# Patient Record
Sex: MALE | Race: BLACK OR AFRICAN AMERICAN | Employment: FULL TIME | URBAN - METROPOLITAN AREA
[De-identification: names, ages, dates, MRNs, and addresses within clinical notes are randomized per-mention and may not be internally consistent; named-entity substitution may affect disease eponyms.]

---

## 2017-10-11 ENCOUNTER — TELEPHONE (OUTPATIENT)
Dept: FAMILY MEDICINE CLINIC | Age: 47
End: 2017-10-11

## 2017-10-12 RX ORDER — ALBUTEROL SULFATE 90 UG/1
AEROSOL, METERED RESPIRATORY (INHALATION)
Qty: 1 INHALER | Refills: 6 | Status: SHIPPED | OUTPATIENT
Start: 2017-10-12 | End: 2018-01-12 | Stop reason: SDUPTHER

## 2017-10-13 ENCOUNTER — OFFICE VISIT (OUTPATIENT)
Dept: FAMILY MEDICINE CLINIC | Age: 47
End: 2017-10-13
Payer: COMMERCIAL

## 2017-10-13 VITALS
DIASTOLIC BLOOD PRESSURE: 80 MMHG | HEIGHT: 70 IN | SYSTOLIC BLOOD PRESSURE: 130 MMHG | BODY MASS INDEX: 23.48 KG/M2 | HEART RATE: 99 BPM | WEIGHT: 164 LBS

## 2017-10-13 DIAGNOSIS — L30.9 CHRONIC ECZEMA: ICD-10-CM

## 2017-10-13 DIAGNOSIS — J45.20 MILD INTERMITTENT ASTHMA WITHOUT COMPLICATION: Primary | ICD-10-CM

## 2017-10-13 PROCEDURE — 99213 OFFICE O/P EST LOW 20 MIN: CPT | Performed by: FAMILY MEDICINE

## 2017-10-13 RX ORDER — PREDNISONE 20 MG/1
TABLET ORAL
Qty: 20 TABLET | Refills: 0 | Status: SHIPPED | OUTPATIENT
Start: 2017-10-13 | End: 2018-01-12 | Stop reason: ALTCHOICE

## 2017-10-13 ASSESSMENT — ENCOUNTER SYMPTOMS
BACK PAIN: 0
CONSTIPATION: 0
BLOOD IN STOOL: 0
DIARRHEA: 0
SHORTNESS OF BREATH: 1
ABDOMINAL PAIN: 0
COUGH: 1
WHEEZING: 0

## 2017-10-13 ASSESSMENT — PATIENT HEALTH QUESTIONNAIRE - PHQ9
2. FEELING DOWN, DEPRESSED OR HOPELESS: 0
1. LITTLE INTEREST OR PLEASURE IN DOING THINGS: 0
SUM OF ALL RESPONSES TO PHQ9 QUESTIONS 1 & 2: 0
SUM OF ALL RESPONSES TO PHQ QUESTIONS 1-9: 0

## 2017-10-13 NOTE — PROGRESS NOTES
Lang Cervantes is a 42 y.o. male who presents today for his medical conditions/complaints as noted below. Lang Cervantes is c/o of Asthma and Health Maintenance (flu, pneumonia, adacel)  Breathing troubles cough for few weeks. HPI:     Visit Information    Have you changed or started any medications since your last visit including any over-the-counter medicines, vitamins, or herbal medicines? no   Are you having any side effects from any of your medications? -  no  Have you stopped taking any of your medications? Is so, why? -  no    Have you seen any other physician or provider since your last visit? No  Have you had any other diagnostic tests since your last visit? No  Have you been seen in the emergency room and/or had an admission to a hospital since we last saw you? No  Have you had your routine dental cleaning in the past 6 months? yes -     Have you activated your [employer] account? If not, what are your barriers? No:      Patient Care Team:  Frankie Sharpe MD as PCP - General (Family Medicine)  Zulma Day MD as Consulting Physician (Ophthalmology)    Medical History Review  Past Medical, Family, and Social History reviewed and  contribute to the patient presenting condition    Health Maintenance   Topic Date Due    HIV screen  12/30/1987    DTaP/Tdap/Td vaccine (1 - Tdap) 12/30/1991    Pneumococcal med risk (1 of 1 - PPSV23) 12/30/1991    Lipid screen  12/30/2012    Flu vaccine (1) 09/01/2017       Past Medical History:   Diagnosis Date    Asthma     Eczema       No past surgical history on file. Family History   Problem Relation Age of Onset    Kidney Disease Mother     Hypertension Mother      Social History   Substance Use Topics    Smoking status: Current Every Day Smoker     Packs/day: 1.00     Years: 15.00     Types: Cigarettes, Cigars    Smokeless tobacco: Never Used    Alcohol use 2.5 oz/week     5 drink(s) per week      Comment: soc.       Current Outpatient Prescriptions

## 2017-10-24 ENCOUNTER — OFFICE VISIT (OUTPATIENT)
Dept: DERMATOLOGY | Age: 47
End: 2017-10-24
Payer: COMMERCIAL

## 2017-10-24 VITALS
HEART RATE: 103 BPM | OXYGEN SATURATION: 96 % | SYSTOLIC BLOOD PRESSURE: 112 MMHG | DIASTOLIC BLOOD PRESSURE: 75 MMHG | BODY MASS INDEX: 23.08 KG/M2 | HEIGHT: 70 IN | WEIGHT: 161.2 LBS

## 2017-10-24 DIAGNOSIS — L20.84 INTRINSIC ATOPIC DERMATITIS: Primary | ICD-10-CM

## 2017-10-24 PROCEDURE — 99203 OFFICE O/P NEW LOW 30 MIN: CPT | Performed by: DERMATOLOGY

## 2017-10-24 RX ORDER — CLOBETASOL PROPIONATE 0.5 MG/G
OINTMENT TOPICAL
Qty: 60 G | Refills: 2 | Status: SHIPPED | OUTPATIENT
Start: 2017-10-24 | End: 2018-04-27 | Stop reason: SDUPTHER

## 2017-10-24 NOTE — PATIENT INSTRUCTIONS
faster putting your maintenance or rescue medicines on them. Remember that your eczema medicines only go on red, rough, thick, or itchy patches of eczema. Continue to use your moisturizer on the footprints several times a day. Your moisturizer may help prevent new, itchy patches and footprints from forming. If you run out of medications or feel that your childs skin is getting worse despite following your treatment plan, please call us. If you think that you will run out of medications over the weekend or during a holiday, please try to call us during normal business hours so that we may get you the refills you need without delay.

## 2017-10-24 NOTE — PROGRESS NOTES
Dermatology Patient Note  [facility] [address]  [address] [place] [id number]  Dept: [phone]  Dept Fax: [phone]      VISIT DATE: 10/24/2017   REFERRING PROVIDER: Melvina Morgan MD      Annika Mata is a 42 y.o. male  who presents today in the office for:    New Patient (eczema his whole life. Does not use anything on it currently)      HISTORY OF PRESENT ILLNESS:  HPI Eczema:    Mundo Johnson was seen today for initial evaluation of eczema. Duration of Eczema:  Years (since childhood)    Course: persistent and worsening    Areas of Involvement: forehead, neck, flexures    Associated Symptoms: Pruritis    Exacerbating Factors: none    Current Bathing Routine: daily    Current Medications for Eczema: prednisone    Previously Tried Topical Medications: clobetasol    Previously Tried Systemic Medications: prednisone, methotrexate    Previous Evaluation: None    Problem Specific Family Hx: eczema        CURRENT MEDICATIONS:   Current Outpatient Prescriptions   Medication Sig Dispense Refill    clobetasol (TEMOVATE) 0.05 % ointment Apply to eczema twice daily (not face, armpit or groin) 60 g 2    predniSONE (DELTASONE) 20 MG tablet Take 3 tabs daily for 3 days, then 2 a day for 3 days, then 1 a day for 3 days, then 1/2 a day for 3 days. 20 tablet 0    albuterol sulfate HFA (PROAIR HFA) 108 (90 Base) MCG/ACT inhaler INHALE TWO PUFFS BY MOUTH FOUR TIMES A DAY AS NEEDED 1 Inhaler 6    tadalafil (CIALIS) 20 MG tablet Take 1 tablet by mouth as needed for Erectile Dysfunction 12 tablet 3     No current facility-administered medications for this visit. ALLERGIES:   No Known Allergies    SOCIAL HISTORY:  Social History   Substance Use Topics    Smoking status: Current Every Day Smoker     Packs/day: 1.00     Years: 15.00     Types: Cigarettes, Cigars    Smokeless tobacco: Never Used    Alcohol use 2.5 oz/week     5 drink(s) per week      Comment: soc.        REVIEW OF SYSTEMS:  Review of Systems   Constitutional: Negative. Skin: Denies any new changing, growing or bleeding lesions or rashes except as described in the HPI     PHYSICAL EXAM:   /75 (Site: Right Arm, Position: Sitting, Cuff Size: Medium Adult)   Pulse 103   Ht 5' 10\" (1.778 m)   Wt 161 lb 3.2 oz (73.1 kg)   SpO2 96%   BMI 23.13 kg/m²     General Exam:  General Appearance: No acute distress, Well nourished     Neuro: Alert  Psych: Not Performed   Lymph Node: Not performed    Cutaneous Exam: Performed as documented in clinic note below. Total skin excluding undergarment areas, which includes the head/face, neck, both arms, chest, back, abdomen, both legs, digits and/or nails, was examined. Pertinent Physical Exam Findings:  Physical Exam   Skin:            Medical Necessity of Exam Performed:   Widespread Rash    Additional Diagnostic Testing performed during exam: Not performed ,  Not performed    ASSESSMENT:  1. Intrinsic atopic dermatitis         Plan of Action is as Follows:  Assessment 1. Intrinsic atopic dermatitis  1. Discussed that the patient has eczema a chronic condition which can be flared by bacteria or environmental allergies  2. Discussed the treatments for eczema including topical medications, antihistamines and fragrance free products. 3. Discussed need to moisturize twice daily with a thick bland emollient  4. Restart clobetasol ointment BID to eczema on the body  5. I discussed in detail treatment options of topicals and systemic medications. The patient is on the highest level topical clobetasol and has not had adequate effect. The patient has previously been on methotrexate for urinary half without adequate effect. He also drinks alcohol which makes his less than a ideal treatment for future use. At this point in time I discussed within the likely the best treatment option would be Dupixent.   I discussed that we will pursue approval once approved set him up to given medications. 6. Discussed side effects of topical steroids including skin thinning and atrophy and importance of using topical steroids only on active eczema            Patient Instructions   Eczema Instructions    The medicines and treatments used to take care of your child's eczema may change depending on the condition of the skin. Eczema flare-ups may come and go. We cannot cure eczema, but we can help control it with these treatments. Baths:  1-2 times a day with a mild soap such as Dove for Sensitive Skin, Cetaphil Cleanser, Cerave Cleanser, Aquaphor Wash or Vanicream Bar. Apply moisturizer within 3 minutes of patting dry. Medicines Prescribed by your Doctor    These medications should only be put on the eczema that you can see or feel. Eczema patches are red, rough, thickened or itchy. Once the skin looks and feels normal stop the medicated creams and ointments. These medications are chosen based on the location and thickness of your eczema. We always want to use the weakest medicated creams and ointments that will control your child's eczema. This will reduce the risk of side effects. If your eczema is not improving on this treatment plan or you need to use your Rescue medicines longer than recommended please call the dermatology clinic. Maintenance Medicines    Maintenance medicines can be used any day when eczema is seen or felt. Apply clobetasol to eczema on body, arms and legs 2 times a day when eczema is present. Moisturizer: This should be applied to all of our child's skin (including skin with eczema and skin without eczema). Continue to use this everyday even when your child's eczema is doing really well. Apply moisturizer at least 2 times a day to all of your child's skin. If you are applying a prescription cream at the same time as a moisturizer, put the prescription cream on first and your moisturizer on top.     Skin color changes may stay after the rough eczema

## 2017-10-24 NOTE — LETTER
[facility]) Dermatology   [address] [place] [address],[address]  [address] [address]  Phone: [phone]  Fax: [phone]     Ryne Clay MD       October 24, 2017     William Ballard Md  [address] [place] [address] [address] [place] [address]     Patient: Geoffrey Abbott   MR Number: [id number]   YOB: 1970   Date of Visit: 10/24/2017     Dear Dr. Sara Bolivar: Thank you for the request for consultation for Mr. Fox to me for evaluation. Below are the relevant portions of my assessment and plan of care. 1. Intrinsic atopic dermatitis  1. Discussed that the patient has eczema a chronic condition which can be flared by bacteria or environmental allergies  2. Discussed the treatments for eczema including topical medications, antihistamines and fragrance free products. 3. Discussed need to moisturize twice daily with a thick bland emollient  4. Restart clobetasol ointment BID to eczema on the body  5. I discussed in detail treatment options of topicals and systemic medications. The patient is on the highest level topical clobetasol and has not had adequate effect. The patient has previously been on methotrexate for urinary half without adequate effect. He also drinks alcohol which makes his less than a ideal treatment for future use. At this point in time I discussed within the likely the best treatment option would be Dupixent. I discussed that we will pursue approval once approved set him up to given medications. 6. Discussed side effects of topical steroids including skin thinning and atrophy and importance of using topical steroids only on active eczema         Patient Instructions   Eczema Instructions    The medicines and treatments used to take care of your child's eczema may change depending on the condition of the skin. Eczema flare-ups may come and go. We cannot cure eczema, but we can help control it with these treatments.

## 2017-11-07 ENCOUNTER — NURSE ONLY (OUTPATIENT)
Dept: DERMATOLOGY | Age: 47
End: 2017-11-07
Payer: COMMERCIAL

## 2017-11-07 NOTE — PATIENT INSTRUCTIONS
Patient Information  DUPIXENT® (DU-pix-ent) (dupilumab) Injection,  for subcutaneous use    What is DUPIXENT? ? Clemon Shield is a prescription medicine used to treat adults with moderate-to-severe atopic dermatitis (eczema) that is not well controlled  with prescription therapies used on the skin (topical), or who cannot use topical therapies. ? Clemon Shield can be used with or without topical corticosteroids. ? It is not known if DUPIXENT is safe and effective in children. Do not use DUPIXENT if you are allergic to dupilumab or to any of the ingredients in Clemon Shield. See the end of this leaflet for a complete  list of ingredients in Clemon Shield. Before using Clemon Shield, tell your healthcare provider about all your medical conditions, including if you:  ? have eye problems  ? have a parasitic (helminth) infection  ? have asthma  ? are scheduled to receive any vaccinations. You should not receive a ?live vaccine? if you are treated with Clemon Shield. ? are pregnant or plan to become pregnant. It is not known whether Clemon Shield will harm your unborn baby. ? are breastfeeding or plan to breastfeed. It is not known whether Clemon Shield passes into your breast milk. Tell your healthcare provider about all of the medicines you take, including prescription and over-the-counter medicines, vitamins, and herbal  supplements. If you have asthma and are taking asthma medicines, do not change or stop your asthma medicine without talking to your  healthcare provider. How should I use DUPIXENT? ? See the detailed ? Instructions for Use? that comes with Clemon Shield for information on how to prepare and inject DUPIXENT and  how to properly store and throw away (dispose of) used DUPIXENT pre-filled syringes. ? Use DUPIXENT exactly as prescribed by your healthcare provider. ? Clemon Shield comes as a single-dose pre-filled syringe with needle shield. ? Clemon Shield is given as an injection under the skin (subcutaneous injection).   ? If your healthcare provider decides that you or a caregiver can give the injections of Kyleview, you or your caregiver should receive  training on the right way to prepare and inject Kyleview. Do not try to inject Kyleview until you have been shown the right way by  your healthcare provider. ? If you miss a dose of DUPIXENT, give the injection within 7 days from the missed dose, then continue with the original schedule. If the  missed dose is not given within 7 days, wait until the next scheduled dose to give your DUPIXENT injection. ? If you inject more DUPIXENT than prescribed, call your healthcare provider right away. ? Your healthcare provider may prescribe other topical medicines to use with Kyleview. Use the other prescribed topical medicines  exactly as your healthcare provider tells you to. What are the possible side effects of DUPIXENT? DUPIXENT can cause serious side effects, including:  ? Allergic reactions. Stop using Kyleview and go to the nearest hospital emergency room if you get any of the following symptoms:   fever   general ill feeling   swollen lymph nodes   hives   itching   joint pain   skin rash  ? Eye problems. Tell your healthcare provider if you have any new or worsening eye problems, including eye pain or changes in vision. The most common side effects of DUPIXENT include:  ? injection site reactions  ? eye and eyelid inflammation, including redness, swelling and itching  ? cold sores in your mouth or on your lips  Tell your healthcare provider if you have any side effect that bothers you or that does not go away. These are not all of the possible side effects of DUPIXENT. Call your doctor for medical advice about side effects. You may report side effects to FDA at 2-076-FDA-7934. General information about the safe and effective use of DUPIXENT. Medicines are sometimes prescribed for purposes other than those listed in a Patient Information leaflet.  Do not use DUPIXENT for a  condition for which it was not prescribed. Do not give DUPIXENT to other people, even if they have the same symptoms that you have. It  may harm them. You can ask your pharmacist or healthcare provider for information about Kyleview that is written for health professionals. What are the ingredients in Kyleview? Active ingredient: dupilumab  Inactive ingredients: L-arginine hydrochloride, L-histidine, polysorbate 80, sodium acetate, sucrose, and water for injection. Manufactured by: [address] [place] [address].[address] [place] [address]. License No. [id number]  Marketed by: Amy Sewell, [address] [place] [address] and [address] [place] [address]  [address] is a registered trademark of [address] / © 2017 [address] [place] [address]. / Amy Atwood. All  rights reserved. For more information about DUPIXENT, go to www. [employer]. [employer]abs or call 9- 412-DUPIXMATT (6-584.113.8511). This Patient Information has been approved by the U.S. Food and Drug Administration.  Issued: March 2017  [id number]

## 2017-11-08 PROCEDURE — 96372 THER/PROPH/DIAG INJ SC/IM: CPT | Performed by: DERMATOLOGY

## 2017-11-14 ENCOUNTER — TELEPHONE (OUTPATIENT)
Dept: DERMATOLOGY | Age: 47
End: 2017-11-14

## 2017-11-14 NOTE — TELEPHONE ENCOUNTER
Spoke with Nieyc to order next dose of dupixent.  It is due to arrive at the clinic on Thursday between 8-4:00

## 2017-11-21 ENCOUNTER — NURSE ONLY (OUTPATIENT)
Dept: DERMATOLOGY | Age: 47
End: 2017-11-21
Payer: COMMERCIAL

## 2017-11-21 DIAGNOSIS — L30.9 CHRONIC ECZEMA: Primary | ICD-10-CM

## 2017-11-21 DIAGNOSIS — Z71.89 OTHER SPECIFIED COUNSELING: ICD-10-CM

## 2017-11-21 PROCEDURE — 96372 THER/PROPH/DIAG INJ SC/IM: CPT | Performed by: DERMATOLOGY

## 2017-11-21 NOTE — PROGRESS NOTES
Patient was in for 2nd Dupixent injection and to educate his girlfriend who is a nurse on proper injection technique. After giving girlfriend instruction using training needle, she demonstrated understanding and mock injection on patient. Patient was given injection by me in the RUQ of abdomen. He tolerated the procedure and a band aid was placed at the site of injection. Completed paperwork with patient for reimbursement of 1st Dupixent prescription from  and gave him the box with 3rd injection of Dupixent as he will have his girlfriend give him in 2 weeks.

## 2018-01-12 ENCOUNTER — OFFICE VISIT (OUTPATIENT)
Dept: FAMILY MEDICINE CLINIC | Age: 48
End: 2018-01-12
Payer: COMMERCIAL

## 2018-01-12 VITALS
SYSTOLIC BLOOD PRESSURE: 124 MMHG | WEIGHT: 162 LBS | BODY MASS INDEX: 23.24 KG/M2 | OXYGEN SATURATION: 95 % | DIASTOLIC BLOOD PRESSURE: 70 MMHG | HEART RATE: 98 BPM

## 2018-01-12 DIAGNOSIS — N52.9 ERECTILE DYSFUNCTION, UNSPECIFIED ERECTILE DYSFUNCTION TYPE: ICD-10-CM

## 2018-01-12 DIAGNOSIS — M77.32 HEEL SPUR, LEFT: Primary | ICD-10-CM

## 2018-01-12 DIAGNOSIS — J45.909 MODERATE ASTHMA WITHOUT COMPLICATION, UNSPECIFIED WHETHER PERSISTENT: ICD-10-CM

## 2018-01-12 PROCEDURE — 99213 OFFICE O/P EST LOW 20 MIN: CPT | Performed by: FAMILY MEDICINE

## 2018-01-12 RX ORDER — ALBUTEROL SULFATE 90 UG/1
AEROSOL, METERED RESPIRATORY (INHALATION)
Qty: 1 INHALER | Refills: 6 | Status: SHIPPED | OUTPATIENT
Start: 2018-01-12 | End: 2021-08-17 | Stop reason: SDUPTHER

## 2018-01-12 RX ORDER — TADALAFIL 20 MG/1
20 TABLET ORAL PRN
Qty: 12 TABLET | Refills: 3 | Status: SHIPPED | OUTPATIENT
Start: 2018-01-12 | End: 2019-08-06 | Stop reason: SDUPTHER

## 2018-01-12 ASSESSMENT — ENCOUNTER SYMPTOMS
SHORTNESS OF BREATH: 0
ABDOMINAL PAIN: 0
CONSTIPATION: 0
DIARRHEA: 0
BACK PAIN: 0
COUGH: 0
WHEEZING: 0
BLOOD IN STOOL: 0

## 2018-02-22 ENCOUNTER — TELEPHONE (OUTPATIENT)
Dept: DERMATOLOGY | Age: 48
End: 2018-02-22

## 2018-04-27 ENCOUNTER — OFFICE VISIT (OUTPATIENT)
Dept: DERMATOLOGY | Age: 48
End: 2018-04-27
Payer: COMMERCIAL

## 2018-04-27 VITALS
DIASTOLIC BLOOD PRESSURE: 75 MMHG | BODY MASS INDEX: 24.48 KG/M2 | OXYGEN SATURATION: 95 % | HEIGHT: 70 IN | WEIGHT: 171 LBS | HEART RATE: 87 BPM | SYSTOLIC BLOOD PRESSURE: 115 MMHG

## 2018-04-27 DIAGNOSIS — L20.84 INTRINSIC ATOPIC DERMATITIS: Primary | ICD-10-CM

## 2018-04-27 PROCEDURE — 99213 OFFICE O/P EST LOW 20 MIN: CPT | Performed by: DERMATOLOGY

## 2018-04-27 RX ORDER — CLOBETASOL PROPIONATE 0.5 MG/G
OINTMENT TOPICAL
Qty: 60 G | Refills: 4 | Status: SHIPPED | OUTPATIENT
Start: 2018-04-27 | End: 2019-05-08 | Stop reason: SDUPTHER

## 2018-09-24 RX ORDER — DUPILUMAB 300 MG/2ML
INJECTION, SOLUTION SUBCUTANEOUS
Qty: 4 ML | Refills: 2 | Status: SHIPPED | OUTPATIENT
Start: 2018-09-24 | End: 2018-11-08 | Stop reason: SDUPTHER

## 2018-11-08 ENCOUNTER — HOSPITAL ENCOUNTER (OUTPATIENT)
Age: 48
Discharge: HOME | End: 2018-11-08
Payer: COMMERCIAL

## 2018-11-08 ENCOUNTER — OFFICE VISIT (OUTPATIENT)
Dept: DERMATOLOGY | Age: 48
End: 2018-11-08
Payer: COMMERCIAL

## 2018-11-08 VITALS
HEART RATE: 89 BPM | SYSTOLIC BLOOD PRESSURE: 123 MMHG | OXYGEN SATURATION: 95 % | DIASTOLIC BLOOD PRESSURE: 82 MMHG | BODY MASS INDEX: 26.45 KG/M2 | WEIGHT: 184.8 LBS | HEIGHT: 70 IN

## 2018-11-08 DIAGNOSIS — L20.84 INTRINSIC ATOPIC DERMATITIS: Primary | ICD-10-CM

## 2018-11-08 DIAGNOSIS — Z79.899 HIGH RISK MEDICATION USE: ICD-10-CM

## 2018-11-08 LAB
ABSOLUTE EOS #: 0.33 K/UL (ref 0–0.44)
ABSOLUTE IMMATURE GRANULOCYTE: <0.03 K/UL (ref 0–0.3)
ABSOLUTE LYMPH #: 1.25 K/UL (ref 1.1–3.7)
ABSOLUTE MONO #: 0.46 K/UL (ref 0.1–1.2)
ALT SERPL-CCNC: 26 U/L (ref 5–41)
AST SERPL-CCNC: 40 U/L
BASOPHILS # BLD: 1 % (ref 0–2)
BASOPHILS ABSOLUTE: 0.05 K/UL (ref 0–0.2)
BUN BLDV-MCNC: 13 MG/DL (ref 6–20)
CREAT SERPL-MCNC: 0.93 MG/DL (ref 0.7–1.2)
DIFFERENTIAL TYPE: ABNORMAL
EOSINOPHILS RELATIVE PERCENT: 8 % (ref 1–4)
GFR AFRICAN AMERICAN: >60 ML/MIN
GFR NON-AFRICAN AMERICAN: >60 ML/MIN
GFR SERPL CREATININE-BSD FRML MDRD: NORMAL ML/MIN/{1.73_M2}
GFR SERPL CREATININE-BSD FRML MDRD: NORMAL ML/MIN/{1.73_M2}
HCT VFR BLD CALC: 44 % (ref 40.7–50.3)
HEMOGLOBIN: 14.6 G/DL (ref 13–17)
IMMATURE GRANULOCYTES: 0 %
LYMPHOCYTES # BLD: 29 % (ref 24–43)
MCH RBC QN AUTO: 31.8 PG (ref 25.2–33.5)
MCHC RBC AUTO-ENTMCNC: 33.2 G/DL (ref 28.4–34.8)
MCV RBC AUTO: 95.9 FL (ref 82.6–102.9)
MONOCYTES # BLD: 11 % (ref 3–12)
NRBC AUTOMATED: 0 PER 100 WBC
PDW BLD-RTO: 13.8 % (ref 11.8–14.4)
PLATELET # BLD: 216 K/UL (ref 138–453)
PLATELET ESTIMATE: ABNORMAL
PMV BLD AUTO: 11 FL (ref 8.1–13.5)
RBC # BLD: 4.59 M/UL (ref 4.21–5.77)
RBC # BLD: ABNORMAL 10*6/UL
SEG NEUTROPHILS: 51 % (ref 36–65)
SEGMENTED NEUTROPHILS ABSOLUTE COUNT: 2.29 K/UL (ref 1.5–8.1)
WBC # BLD: 4.4 K/UL (ref 3.5–11.3)
WBC # BLD: ABNORMAL 10*3/UL

## 2018-11-08 PROCEDURE — 99213 OFFICE O/P EST LOW 20 MIN: CPT | Performed by: DERMATOLOGY

## 2018-11-08 PROCEDURE — 36415 COLL VENOUS BLD VENIPUNCTURE: CPT

## 2018-11-08 PROCEDURE — 84460 ALANINE AMINO (ALT) (SGPT): CPT

## 2018-11-08 PROCEDURE — 84520 ASSAY OF UREA NITROGEN: CPT

## 2018-11-08 PROCEDURE — 85025 COMPLETE CBC W/AUTO DIFF WBC: CPT

## 2018-11-08 PROCEDURE — 84450 TRANSFERASE (AST) (SGOT): CPT

## 2018-11-08 PROCEDURE — 82565 ASSAY OF CREATININE: CPT

## 2018-11-08 NOTE — PATIENT INSTRUCTIONS
1. Labs today  2. Follow up in 6 weeks    Patient Information  DUPIXENT® (DU-pix-ent) (dupilumab) Injection,  for subcutaneous use    What is DUPIXENT? ? Carloyn Isabel is a prescription medicine used to treat adults with moderate-to-severe atopic dermatitis (eczema) that is not well controlled  with prescription therapies used on the skin (topical), or who cannot use topical therapies. ? Carloyn Isabel can be used with or without topical corticosteroids. ? It is not known if DUPIXENT is safe and effective in children. Do not use DUPIXENT if you are allergic to dupilumab or to any of the ingredients in Carloyn Isabel. See the end of this leaflet for a complete  list of ingredients in Carloyn Isabel. Before using Carloyn Alber, tell your healthcare provider about all your medical conditions, including if you:  ? have eye problems  ? have a parasitic (helminth) infection  ? have asthma  ? are scheduled to receive any vaccinations. You should not receive a ?live vaccine? if you are treated with Carloyn Isabel. ? are pregnant or plan to become pregnant. It is not known whether Carloyn Isabel will harm your unborn baby. ? are breastfeeding or plan to breastfeed. It is not known whether Carloyn Alber passes into your breast milk. Tell your healthcare provider about all of the medicines you take, including prescription and over-the-counter medicines, vitamins, and herbal  supplements. If you have asthma and are taking asthma medicines, do not change or stop your asthma medicine without talking to your  healthcare provider. How should I use DUPIXENT? ? See the detailed ? Instructions for Use? that comes with Carloyn Isabel for information on how to prepare and inject DUPIXENT and  how to properly store and throw away (dispose of) used DUPIXENT pre-filled syringes. ? Use DUPIXENT exactly as prescribed by your healthcare provider. ? Carloyn Alber comes as a single-dose pre-filled syringe with needle shield.   ? Carloyn Isabel is given as an injection under the skin

## 2019-05-08 ENCOUNTER — OFFICE VISIT (OUTPATIENT)
Dept: DERMATOLOGY | Age: 49
End: 2019-05-08
Payer: COMMERCIAL

## 2019-05-08 VITALS
BODY MASS INDEX: 25.14 KG/M2 | SYSTOLIC BLOOD PRESSURE: 122 MMHG | OXYGEN SATURATION: 94 % | HEIGHT: 70 IN | HEART RATE: 100 BPM | DIASTOLIC BLOOD PRESSURE: 77 MMHG | WEIGHT: 175.6 LBS

## 2019-05-08 DIAGNOSIS — L20.84 INTRINSIC ATOPIC DERMATITIS: Primary | ICD-10-CM

## 2019-05-08 PROCEDURE — 99213 OFFICE O/P EST LOW 20 MIN: CPT | Performed by: DERMATOLOGY

## 2019-05-08 RX ORDER — CLOBETASOL PROPIONATE 0.5 MG/G
OINTMENT TOPICAL
Qty: 60 G | Refills: 4 | Status: SHIPPED | OUTPATIENT
Start: 2019-05-08 | End: 2020-06-05 | Stop reason: SDUPTHER

## 2019-05-08 RX ORDER — TACROLIMUS 1 MG/G
OINTMENT TOPICAL
Qty: 30 G | Refills: 5 | Status: SHIPPED | OUTPATIENT
Start: 2019-05-08 | End: 2020-06-05 | Stop reason: SDUPTHER

## 2019-05-09 NOTE — PROGRESS NOTES
Dermatology Patient Note  [facility]ERMATOLOGY  [address] [facility]  [address] [place] [address]  Dept: [phone]  Dept Fax: [phone]      VISIT DATE: 2019   REFERRING PROVIDER: No ref. provider found      Shabnam Higgins is a 57 y.o. male  who presents today in the office for:    Follow-up (6 month follow up on dupixent. Doing well. Last inj . )      HISTORY OF PRESENT ILLNESS:  HPI Eczema Followup:    Burton Salazar was seen today for follow-up evaluation of eczema. Interim Course: Improving    Areas of Involvement: now just face    Associated Symptoms: Pruritis    Exacerbating Factors: being off medication    Current Bathing Routine: sensitive    Current Moisturizing Routine: thick emollient    Current Medications for Eczema: clobetaso, dupixent    Eczema Medication Compliance: Using all Topical and Systemic Medications as Prescribed at Last Visit    Side Effects from Treatments: None    Interim Evaluation: None          CURRENT MEDICATIONS:   Current Outpatient Medications   Medication Sig Dispense Refill    clobetasol (TEMOVATE) 0.05 % ointment Apply to eczema twice daily (not face, armpit or groin) 60 g 4    dupilumab (DUPIXENT) 300 MG/2ML SOSY injection INJECT 300MG SUBCUTANEOUSLY EVERY OTHER WEEK 4 mL 6    tacrolimus (PROTOPIC) 0.1 % ointment Apply to eczema on face twice daily 30 g 5    albuterol sulfate HFA (PROAIR HFA) 108 (90 Base) MCG/ACT inhaler INHALE TWO PUFFS BY MOUTH FOUR TIMES A DAY AS NEEDED 1 Inhaler 6    tadalafil (CIALIS) 20 MG tablet Take 1 tablet by mouth as needed for Erectile Dysfunction 12 tablet 3     No current facility-administered medications for this visit.         ALLERGIES:   No Known Allergies    SOCIAL HISTORY:  Social History     Tobacco Use    Smoking status: Former Smoker     Packs/day: 1.00     Years: 15.00     Pack years: 15.00     Types: Cigarettes, Cigars     Last attempt to quit: 10/10/2017     Years since quittin.5    Smokeless tobacco: Never Used   Substance Use Topics    Alcohol use: Yes     Alcohol/week: 2.5 oz     Types: 5 Standard drinks or equivalent per week     Comment: soc. REVIEW OF SYSTEMS:  Review of Systems   Constitutional: Negative. Skin:Denies any new changing, growing or bleeding lesions or rashes except as described in the HPI     PHYSICAL EXAM:   /77 (Site: Right Upper Arm, Position: Sitting, Cuff Size: Medium Adult)   Pulse 100   Ht 5' 10\" (1.778 m)   Wt 175 lb 9.6 oz (79.7 kg)   SpO2 94%   BMI 25.20 kg/m²     General Exam:  General Appearance: No acute distress, Well nourished     Neuro: Alert and oriented to person, place and time  Psych: Normal affect   Lymph Node: Not performed    Cutaneous Exam: Performed as documented in clinic note below. Sun-exposed skin,which includes the head/face, neck, both arms, digits and/or nails was examined. Pertinent Physical Exam Findings:  Physical Exam   Skin:   Focal eczema on the face       Medical Necessity of Exam Performed:   Distribution of patient concerns    Additional Diagnostic Testing performed during exam: Not performed ,  Not performed    ASSESSMENT:   Diagnosis Orders   1. Intrinsic atopic dermatitis         Plan of Action is as Follows:  Assessment 1. Intrinsic atopic dermatitis  - Continue dupixent 300 mg every other week - SE denied  - Protopic BID to facial eczema  - Clobetasol as needed to eczema on the body          Patient Instructions   1. Call Ian Vaughan @ [phone] to discuss Dupixent  2. Follow up in 6 months      Photo surveillance performed: No    Follow-up:  6 months    This note was created with the assistance of aspeech-recognition program.  Although the intention is to generate a document that actually reflects thecontent of the visit, no guarantees can be provided that every mistake has been identified and corrected by editing.     Electronically signed by Bijan Angulo MD on 5/9/19 at 7:58 AM

## 2019-08-06 ENCOUNTER — OFFICE VISIT (OUTPATIENT)
Dept: FAMILY MEDICINE CLINIC | Age: 49
End: 2019-08-06
Payer: COMMERCIAL

## 2019-08-06 VITALS
DIASTOLIC BLOOD PRESSURE: 82 MMHG | SYSTOLIC BLOOD PRESSURE: 130 MMHG | RESPIRATION RATE: 16 BRPM | OXYGEN SATURATION: 98 % | HEART RATE: 69 BPM

## 2019-08-06 DIAGNOSIS — Z80.42 FH: PROSTATE CANCER: ICD-10-CM

## 2019-08-06 DIAGNOSIS — N52.9 ERECTILE DYSFUNCTION, UNSPECIFIED ERECTILE DYSFUNCTION TYPE: ICD-10-CM

## 2019-08-06 DIAGNOSIS — Z51.81 MEDICATION MONITORING ENCOUNTER: ICD-10-CM

## 2019-08-06 DIAGNOSIS — L30.9 CHRONIC ECZEMA: ICD-10-CM

## 2019-08-06 DIAGNOSIS — J45.909 MODERATE ASTHMA WITHOUT COMPLICATION, UNSPECIFIED WHETHER PERSISTENT: Primary | ICD-10-CM

## 2019-08-06 PROCEDURE — 99213 OFFICE O/P EST LOW 20 MIN: CPT | Performed by: FAMILY MEDICINE

## 2019-08-06 RX ORDER — TADALAFIL 20 MG/1
20 TABLET ORAL PRN
Qty: 12 TABLET | Refills: 3 | Status: SHIPPED | OUTPATIENT
Start: 2019-08-06 | End: 2021-01-29 | Stop reason: SDUPTHER

## 2019-08-06 ASSESSMENT — ENCOUNTER SYMPTOMS
CONSTIPATION: 0
BLOOD IN STOOL: 0
WHEEZING: 1
ABDOMINAL PAIN: 0
DIARRHEA: 0
COUGH: 0
SHORTNESS OF BREATH: 0
BACK PAIN: 0

## 2019-08-06 ASSESSMENT — PATIENT HEALTH QUESTIONNAIRE - PHQ9
2. FEELING DOWN, DEPRESSED OR HOPELESS: 0
SUM OF ALL RESPONSES TO PHQ QUESTIONS 1-9: 0
SUM OF ALL RESPONSES TO PHQ QUESTIONS 1-9: 0
SUM OF ALL RESPONSES TO PHQ9 QUESTIONS 1 & 2: 0
1. LITTLE INTEREST OR PLEASURE IN DOING THINGS: 0

## 2019-08-06 NOTE — PROGRESS NOTES
Nae Carias is a 57 y.o. male who presents todayfor his medical conditions/complaints as noted below. Nae Carias is here today c/oProstate Cancer (would like screened for this via blood test)  Routine follow up he tells me he is very concerned over his FH of prostate cancer (brother and father). His eczema and asthma are under very good control.      :     Visit Information    Have you changed or started any medications since your last visit including any over-the-counter medicines, vitamins, or herbal medicines? no   Have you stopped taking any of your medications? Is so, why? -  no  Are you having any side effects from any of your medications? - no    Have you seen any other physician or provider since your last visit?  no   Have you had any other diagnostic tests since your last visit?  no   Have you been seen in the emergency room and/or had an admission in a hospital since we last saw you?  no   Have you had your routine dental cleaning in the past 6 months?  yes -      Do you have an active MyChart account? If no, what is the barrier? Yes    Patient Care Team:  Edward Rodriguez MD as PCP - General (Family Medicine)  Edward Rodriguez MD as PCP - [facility] Empaneled Provider  Kim Fowler MD as Consulting Physician (Ophthalmology)    Medical History Review  Past Medical, Family, and Social History reviewed and does not contribute to the patient presenting condition    Health Maintenance   Topic Date Due    Pneumococcal 0-64 years Vaccine (1 of 1 - PPSV23) 12/30/1978    HIV screen  12/30/1987    DTaP/Tdap/Td vaccine (1 - Tdap) 12/30/1991    Lipid screen  12/30/2012    Diabetes screen  12/30/2012    Flu vaccine (1) 09/01/2019                 Past Medical History:   Diagnosis Date    Asthma     Eczema       No past surgical history on file.   Family History   Problem Relation Age of Onset    Kidney Disease Mother     Hypertension Mother      Social History     Tobacco Use    Smoking status:

## 2019-08-07 ENCOUNTER — HOSPITAL ENCOUNTER (OUTPATIENT)
Age: 49
Discharge: HOME | End: 2019-08-07
Payer: COMMERCIAL

## 2019-08-07 DIAGNOSIS — Z51.81 MEDICATION MONITORING ENCOUNTER: ICD-10-CM

## 2019-08-07 DIAGNOSIS — Z80.42 FH: PROSTATE CANCER: ICD-10-CM

## 2019-08-07 DIAGNOSIS — J45.909 MODERATE ASTHMA WITHOUT COMPLICATION, UNSPECIFIED WHETHER PERSISTENT: ICD-10-CM

## 2019-08-07 LAB
ALBUMIN SERPL-MCNC: 4.5 G/DL (ref 3.5–5.2)
ALBUMIN/GLOBULIN RATIO: 1.5 (ref 1–2.5)
ALP BLD-CCNC: 90 U/L (ref 40–129)
ALT SERPL-CCNC: 22 U/L (ref 5–41)
ANION GAP SERPL CALCULATED.3IONS-SCNC: 14 MMOL/L (ref 9–17)
AST SERPL-CCNC: 22 U/L
BILIRUB SERPL-MCNC: 0.67 MG/DL (ref 0.3–1.2)
BUN BLDV-MCNC: 10 MG/DL (ref 6–20)
BUN/CREAT BLD: NORMAL (ref 9–20)
CALCIUM SERPL-MCNC: 9.7 MG/DL (ref 8.6–10.4)
CHLORIDE BLD-SCNC: 107 MMOL/L (ref 98–107)
CHOLESTEROL/HDL RATIO: 5
CHOLESTEROL: 233 MG/DL
CO2: 20 MMOL/L (ref 20–31)
CREAT SERPL-MCNC: 0.81 MG/DL (ref 0.7–1.2)
GFR AFRICAN AMERICAN: >60 ML/MIN
GFR NON-AFRICAN AMERICAN: >60 ML/MIN
GFR SERPL CREATININE-BSD FRML MDRD: NORMAL ML/MIN/{1.73_M2}
GFR SERPL CREATININE-BSD FRML MDRD: NORMAL ML/MIN/{1.73_M2}
GLUCOSE BLD-MCNC: 81 MG/DL (ref 70–99)
HDLC SERPL-MCNC: 47 MG/DL
LDL CHOLESTEROL: 161 MG/DL (ref 0–130)
POTASSIUM SERPL-SCNC: 4.3 MMOL/L (ref 3.7–5.3)
PROSTATE SPECIFIC ANTIGEN: 0.78 UG/L
SODIUM BLD-SCNC: 141 MMOL/L (ref 135–144)
TOTAL PROTEIN: 7.5 G/DL (ref 6.4–8.3)
TRIGL SERPL-MCNC: 127 MG/DL
VLDLC SERPL CALC-MCNC: ABNORMAL MG/DL (ref 1–30)

## 2019-08-07 PROCEDURE — G0103 PSA SCREENING: HCPCS

## 2019-08-07 PROCEDURE — 80061 LIPID PANEL: CPT

## 2019-08-07 PROCEDURE — 36415 COLL VENOUS BLD VENIPUNCTURE: CPT

## 2019-08-07 PROCEDURE — 80053 COMPREHEN METABOLIC PANEL: CPT

## 2019-11-12 ENCOUNTER — OFFICE VISIT (OUTPATIENT)
Dept: DERMATOLOGY | Age: 49
End: 2019-11-12
Payer: COMMERCIAL

## 2019-11-12 VITALS
DIASTOLIC BLOOD PRESSURE: 84 MMHG | OXYGEN SATURATION: 92 % | SYSTOLIC BLOOD PRESSURE: 125 MMHG | HEART RATE: 99 BPM | HEIGHT: 70 IN | WEIGHT: 168.2 LBS | BODY MASS INDEX: 24.08 KG/M2

## 2019-11-12 DIAGNOSIS — L20.84 INTRINSIC ATOPIC DERMATITIS: Primary | ICD-10-CM

## 2019-11-12 PROCEDURE — 11900 INJECT SKIN LESIONS </W 7: CPT | Performed by: DERMATOLOGY

## 2019-11-12 PROCEDURE — 99213 OFFICE O/P EST LOW 20 MIN: CPT | Performed by: DERMATOLOGY

## 2020-04-13 RX ORDER — DUPILUMAB 300 MG/2ML
INJECTION, SOLUTION SUBCUTANEOUS
Qty: 4 ML | Refills: 2 | Status: SHIPPED | OUTPATIENT
Start: 2020-04-13 | End: 2020-06-05 | Stop reason: SDUPTHER

## 2020-06-05 ENCOUNTER — TELEMEDICINE (OUTPATIENT)
Dept: DERMATOLOGY | Age: 50
End: 2020-06-05
Payer: COMMERCIAL

## 2020-06-05 PROCEDURE — 99213 OFFICE O/P EST LOW 20 MIN: CPT | Performed by: DERMATOLOGY

## 2020-06-05 RX ORDER — FLURANDRENOLIDE 4 UG/CM2
TAPE TOPICAL
Qty: 1 EACH | Refills: 5 | Status: SHIPPED | OUTPATIENT
Start: 2020-06-05 | End: 2021-01-29

## 2020-06-05 RX ORDER — CLOBETASOL PROPIONATE 0.5 MG/G
OINTMENT TOPICAL
Qty: 60 G | Refills: 4 | Status: SHIPPED | OUTPATIENT
Start: 2020-06-05 | End: 2021-01-28 | Stop reason: SDUPTHER

## 2020-06-05 RX ORDER — TACROLIMUS 1 MG/G
OINTMENT TOPICAL
Qty: 30 G | Refills: 5 | Status: SHIPPED | OUTPATIENT
Start: 2020-06-05 | End: 2021-01-28 | Stop reason: SDUPTHER

## 2020-06-05 RX ORDER — DUPILUMAB 300 MG/2ML
INJECTION, SOLUTION SUBCUTANEOUS
Qty: 4 ML | Refills: 5 | Status: SHIPPED | OUTPATIENT
Start: 2020-06-05 | End: 2020-11-05 | Stop reason: SDUPTHER

## 2020-06-07 NOTE — PROGRESS NOTES
TELEHEALTH EVALUATION -- Audio/Visual (During WPHKW-48 public health emergency)    Dermatology Patient Note  [id number]  [address] [facility] [address]  Dept: [phone]  Dept Fax: [phone]      VISIT DATE: 6/5/2020   REFERRING PROVIDER: No ref. provider found      Gina Rodriguez is a 54 y.o. male  who presents today in the office for:    Eczema      HISTORY OF PRESENT ILLNESS:  HPI Eczema Followup:    Gilda Walter was seen today for follow-up evaluation of eczema. Interim Course: Improving    Areas of Involvement: was generalized, now just gets focal areas    Associated Symptoms: Pruritis    Exacerbating Factors: Heat    Current Medications for Eczema: dupixent, cordran for resistant areas, clobetasol very intermittent to thick areas on body, protopic for thin areas and facial    Eczema Medication Compliance: Using all Topical and Systemic Medications as Prescribed at Last Visit    Side Effects from Treatments: None    Interim Evaluation: None          CURRENT MEDICATIONS:   Current Outpatient Medications   Medication Sig Dispense Refill    tacrolimus (PROTOPIC) 0.1 % ointment Apply to eczema on face twice daily 30 g 5    clobetasol (TEMOVATE) 0.05 % ointment Apply to eczema twice daily (not face, armpit or groin) 60 g 4    dupilumab (DUPIXENT) 300 MG/2ML SOSY injection INJECT 300MG SUBCUTANEOUSLY EVERY OTHER WEEK 4 mL 5    Flurandrenolide (CORDRAN) 4 MCG/SQCM TAPE Cut to size and apply to resistant eczema daily until resolved 1 each 5    tadalafil (CIALIS) 20 MG tablet Take 1 tablet by mouth as needed for Erectile Dysfunction 12 tablet 3    albuterol sulfate HFA (PROAIR HFA) 108 (90 Base) MCG/ACT inhaler INHALE TWO PUFFS BY MOUTH FOUR TIMES A DAY AS NEEDED 1 Inhaler 6     No current facility-administered medications for this visit.         ALLERGIES:   No Known Allergies    SOCIAL HISTORY:  Social History     Tobacco Use    Smoking status: Former Smoker     Packs/day: 1.00     Years: 15.00     Pack years: 15.00     Types: Cigarettes, Cigars     Last attempt to quit: 10/10/2017     Years since quittin.6    Smokeless tobacco: Never Used   Substance Use Topics    Alcohol use: Yes     Alcohol/week: 4.2 standard drinks     Types: 5 Standard drinks or equivalent per week     Comment: soc. REVIEW OF SYSTEMS:  Review of Systems   Constitutional: Negative. Skin:Denies any new changing, growing or bleeding lesions or rashes except as described in the HPI     PHYSICAL EXAM:   There were no vitals taken for this visit. General Exam:  General Appearance: No acute distress, Well nourished     Neuro: Alert and oriented to person, place and time  Psych: Normal affect   Lymph Node: Not performed    Cutaneous Exam: Performed as documented in clinic note below. Sun-exposed skin,which includes the head/face, neck, both arms, digits and/or nails was examined. Due to this being a TeleHealth encounter, evaluation of the following organ systems is limited: Vitals/Constitutional/EENT/Resp/CV/GI//MS/Neuro/Skin/Heme-Lymph-Imm. In particular examination of the skin is limited by video quality and patient available technology. Pertinent Physical Exam Findings:  Physical Exam  Skin:     Comments: Eczema clear         Medical Necessity of Exam Performed:   Distribution of patient concerns    Additional Diagnostic Testing performed during exam: Not performed ,  Not performed    ASSESSMENT:   Diagnosis Orders   1. Intrinsic atopic dermatitis         Plan of Action is as Follows:  Assessment 1.  Intrinsic atopic dermatitis  - Currently clear, SE of treatments reviewed and denied  - Continue dupixent 300 mg every 2 weeks  - Continue Protopic for facial eczema  - Continue clobetasol for thick body plaques  - Continue cordran for resistant areas  - Follow up in 6 months          Photo surveillance performed: No    Follow-up: 6 months    Pursuant to the emergency declaration under the [facility], [facility] waiver authority and the [facility] and [employer] Act, this Virtual  Visit was conducted, with patient's consent, to reduce the patient's risk of exposure to COVID-19 and provide continuity of care for an established patient. Services were provided through a video synchronous discussion virtually to substitute for in-person clinic visit.      Electronically signed by Joelle Ayala MD on 6/7/20 at 9:19 AM EDT

## 2020-09-30 ENCOUNTER — OFFICE VISIT (OUTPATIENT)
Dept: FAMILY MEDICINE CLINIC | Age: 50
End: 2020-09-30
Payer: COMMERCIAL

## 2020-09-30 ENCOUNTER — HOSPITAL ENCOUNTER (OUTPATIENT)
Age: 50
Setting detail: SPECIMEN
Discharge: HOME | End: 2020-09-30
Payer: COMMERCIAL

## 2020-09-30 VITALS
BODY MASS INDEX: 25.37 KG/M2 | OXYGEN SATURATION: 95 % | DIASTOLIC BLOOD PRESSURE: 80 MMHG | TEMPERATURE: 97.2 F | HEART RATE: 84 BPM | WEIGHT: 177.2 LBS | SYSTOLIC BLOOD PRESSURE: 120 MMHG | HEIGHT: 70 IN

## 2020-09-30 LAB
ABSOLUTE EOS #: 0.4 K/UL (ref 0–0.44)
ABSOLUTE IMMATURE GRANULOCYTE: <0.03 K/UL (ref 0–0.3)
ABSOLUTE LYMPH #: 1.03 K/UL (ref 1.1–3.7)
ABSOLUTE MONO #: 0.34 K/UL (ref 0.1–1.2)
BASOPHILS # BLD: 2 % (ref 0–2)
BASOPHILS ABSOLUTE: 0.07 K/UL (ref 0–0.2)
DIFFERENTIAL TYPE: ABNORMAL
EOSINOPHILS RELATIVE PERCENT: 12 % (ref 1–4)
HCT VFR BLD CALC: 47 % (ref 40.7–50.3)
HEMOGLOBIN: 15.5 G/DL (ref 13–17)
IMMATURE GRANULOCYTES: 0 %
LYMPHOCYTES # BLD: 31 % (ref 24–43)
MCH RBC QN AUTO: 31.9 PG (ref 25.2–33.5)
MCHC RBC AUTO-ENTMCNC: 33 G/DL (ref 28.4–34.8)
MCV RBC AUTO: 96.7 FL (ref 82.6–102.9)
MONOCYTES # BLD: 10 % (ref 3–12)
NRBC AUTOMATED: 0 PER 100 WBC
PDW BLD-RTO: 12.8 % (ref 11.8–14.4)
PLATELET # BLD: 238 K/UL (ref 138–453)
PLATELET ESTIMATE: ABNORMAL
PMV BLD AUTO: 10.8 FL (ref 8.1–13.5)
PROSTATE SPECIFIC ANTIGEN: 1.14 UG/L
RBC # BLD: 4.86 M/UL (ref 4.21–5.77)
RBC # BLD: ABNORMAL 10*6/UL
SEDIMENTATION RATE, ERYTHROCYTE: 8 MM (ref 0–15)
SEG NEUTROPHILS: 45 % (ref 36–65)
SEGMENTED NEUTROPHILS ABSOLUTE COUNT: 1.47 K/UL (ref 1.5–8.1)
WBC # BLD: 3.3 K/UL (ref 3.5–11.3)
WBC # BLD: ABNORMAL 10*3/UL

## 2020-09-30 PROCEDURE — 99213 OFFICE O/P EST LOW 20 MIN: CPT | Performed by: FAMILY MEDICINE

## 2020-09-30 RX ORDER — TOBRAMYCIN 3 MG/ML
1 SOLUTION/ DROPS OPHTHALMIC EVERY 4 HOURS
Qty: 1 BOTTLE | Refills: 0 | Status: SHIPPED | OUTPATIENT
Start: 2020-09-30 | End: 2020-10-10

## 2020-09-30 RX ORDER — ALBUTEROL SULFATE 90 UG/1
2 AEROSOL, METERED RESPIRATORY (INHALATION) EVERY 6 HOURS PRN
Qty: 1 INHALER | Refills: 3 | Status: SHIPPED | OUTPATIENT
Start: 2020-09-30 | End: 2021-01-29

## 2020-09-30 ASSESSMENT — PATIENT HEALTH QUESTIONNAIRE - PHQ9
SUM OF ALL RESPONSES TO PHQ9 QUESTIONS 1 & 2: 0
SUM OF ALL RESPONSES TO PHQ QUESTIONS 1-9: 0
2. FEELING DOWN, DEPRESSED OR HOPELESS: 0
SUM OF ALL RESPONSES TO PHQ QUESTIONS 1-9: 0
1. LITTLE INTEREST OR PLEASURE IN DOING THINGS: 0

## 2020-09-30 ASSESSMENT — ENCOUNTER SYMPTOMS
EYE ITCHING: 1
EYE DISCHARGE: 1
PHOTOPHOBIA: 0
DIARRHEA: 0
SHORTNESS OF BREATH: 0
CONSTIPATION: 0
COUGH: 0
WHEEZING: 0
BLOOD IN STOOL: 0
ABDOMINAL PAIN: 0
BACK PAIN: 0
EYE REDNESS: 1

## 2020-09-30 ASSESSMENT — VISUAL ACUITY: OU: 1

## 2020-09-30 NOTE — PROGRESS NOTES
Aleksandr Juares is a 54 y.o. male who presents todayfor his medical conditions/complaints as noted below. Aleksandr Juares is here today c/oDiscuss Medications and Eye Problem      :     HPI    Red eye    Past Medical History:   Diagnosis Date    Asthma     Eczema       No past surgical history on file. Family History   Problem Relation Age of Onset    Kidney Disease Mother     Hypertension Mother      Social History     Tobacco Use    Smoking status: Former Smoker     Packs/day: 1.00     Years: 15.00     Pack years: 15.00     Types: Cigarettes, Cigars     Last attempt to quit: 10/10/2017     Years since quittin.9    Smokeless tobacco: Never Used   Substance Use Topics    Alcohol use: Yes     Alcohol/week: 4.2 standard drinks     Types: 5 Standard drinks or equivalent per week     Comment: soc. Current Outpatient Medications   Medication Sig Dispense Refill    tobramycin (TOBREX) 0.3 % ophthalmic solution Place 1 drop into the left eye every 4 hours for 10 days 1 Bottle 0    albuterol sulfate HFA (PROAIR HFA) 108 (90 Base) MCG/ACT inhaler Inhale 2 puffs into the lungs every 6 hours as needed for Wheezing 1 Inhaler 3    tacrolimus (PROTOPIC) 0.1 % ointment Apply to eczema on face twice daily 30 g 5    clobetasol (TEMOVATE) 0.05 % ointment Apply to eczema twice daily (not face, armpit or groin) 60 g 4    dupilumab (DUPIXENT) 300 MG/2ML SOSY injection INJECT 300MG SUBCUTANEOUSLY EVERY OTHER WEEK 4 mL 5    Flurandrenolide (CORDRAN) 4 MCG/SQCM TAPE Cut to size and apply to resistant eczema daily until resolved 1 each 5    tadalafil (CIALIS) 20 MG tablet Take 1 tablet by mouth as needed for Erectile Dysfunction 12 tablet 3    albuterol sulfate HFA (PROAIR HFA) 108 (90 Base) MCG/ACT inhaler INHALE TWO PUFFS BY MOUTH FOUR TIMES A DAY AS NEEDED 1 Inhaler 6     No current facility-administered medications for this visit.       No Known Allergies      Subjective:   Review of Systems    :   BP 120/80   Pulse 84   Temp 97.2 °F (36.2 °C)   Ht 5' 10\" (1.778 m)   Wt 177 lb 3.2 oz (80.4 kg)   SpO2 95%   BMI 25.43 kg/m²     Physical Exam    Assessment:       Diagnosis Orders   1. Keratitis  VIGNESH Drake MD, Ophthalmology, [place]    CBC Auto Differential    Sedimentation rate, automated   2. Prostate cancer screening  Psa screening         Plan:      Return if symptoms worsen or fail to improve.     Orders Placed This Encounter   Procedures    CBC Auto Differential     Standing Status:   Future     Standing Expiration Date:   9/30/2021    Psa screening     Standing Status:   Future     Standing Expiration Date:   9/30/2021    Sedimentation rate, automated     Standing Status:   Future     Standing Expiration Date:   9/30/2021    VIGNESH Drake MD, Ophthalmology, [place]     Referral Priority:   Routine     Referral Type:   Eval and Treat     Referral Reason:   Specialty Services Required     Referred to Provider:   Emma Smith MD     Requested Specialty:   Ophthalmology     Number of Visits Requested:   1     Orders Placed This Encounter   Medications    tobramycin (TOBREX) 0.3 % ophthalmic solution     Sig: Place 1 drop into the left eye every 4 hours for 10 days     Dispense:  1 Bottle     Refill:  0    albuterol sulfate HFA (PROAIR HFA) 108 (90 Base) MCG/ACT inhaler     Sig: Inhale 2 puffs into the lungs every 6 hours as needed for Wheezing     Dispense:  1 Inhaler     Refill:  3

## 2020-09-30 NOTE — PROGRESS NOTES
facility-administered medications for this visit. No Known Allergies      Subjective:   Review of Systems   Constitutional: Negative for chills, diaphoresis, fatigue and fever. HENT: Negative for congestion and hearing loss. Eyes: Positive for discharge, redness and itching. Negative for photophobia and visual disturbance. Respiratory: Negative for cough, shortness of breath and wheezing. Cardiovascular: Negative for chest pain, palpitations and leg swelling. Gastrointestinal: Negative for abdominal pain, blood in stool, constipation and diarrhea. Genitourinary: Negative for dysuria. Musculoskeletal: Negative for arthralgias, back pain, gait problem and neck pain. Skin: Negative for rash. Neurological: Negative for weakness, numbness and headaches. Psychiatric/Behavioral: Negative for dysphoric mood and sleep disturbance.       :   /80   Pulse 84   Temp 97.2 °F (36.2 °C)   Ht 5' 10\" (1.778 m)   Wt 177 lb 3.2 oz (80.4 kg)   SpO2 95%   BMI 25.43 kg/m²     Physical Exam  Constitutional:       General: He is not in acute distress. Appearance: He is well-developed. He is not diaphoretic. HENT:      Head: Normocephalic and atraumatic. Mouth/Throat:      Pharynx: No oropharyngeal exudate. Eyes:      General: Vision grossly intact. No allergic shiner or scleral icterus. Right eye: No discharge. Left eye: Discharge present. Conjunctiva/sclera:      Right eye: Right conjunctiva is not injected. Left eye: Left conjunctiva is injected. Neck:      Musculoskeletal: Neck supple. Thyroid: No thyromegaly. Vascular: No carotid bruit. Cardiovascular:      Rate and Rhythm: Normal rate and regular rhythm. Heart sounds: Normal heart sounds. No murmur. No friction rub. No gallop. Pulmonary:      Effort: No respiratory distress. Breath sounds: Normal breath sounds. No wheezing or rales. Chest:      Chest wall: No tenderness. Abdominal:      Tenderness: There is no abdominal tenderness. Musculoskeletal:         General: No tenderness. Lymphadenopathy:      Cervical: No cervical adenopathy. Skin:     Findings: No rash. Neurological:      Mental Status: He is alert and oriented to person, place, and time. Cranial Nerves: No cranial nerve deficit. Coordination: Coordination normal.   Psychiatric:         Behavior: Behavior normal.         Thought Content: Thought content normal.         Judgment: Judgment normal.         Assessment:       Diagnosis Orders   1. Keratitis  VIGNESH Fletcher MD, Ophthalmology, [facility]    CBC Auto Differential    Sedimentation rate, automated   2. Prostate cancer screening  Psa screening         Plan:      No follow-ups on file. Orders Placed This Encounter   Procedures    CBC Auto Differential     Standing Status:   Future     Standing Expiration Date:   9/30/2021    Psa screening     Standing Status:   Future     Standing Expiration Date:   9/30/2021    Sedimentation rate, automated     Standing Status:   Future     Standing Expiration Date:   9/30/2021    VIGNESH Fletcher MD, Ophthalmology, [facility]     Referral Priority:   Routine     Referral Type:   Eval and Treat     Referral Reason:   Specialty Services Required     Referred to Provider:   Radha Hernandez MD     Requested Specialty:   Ophthalmology     Number of Visits Requested:   1     Orders Placed This Encounter   Medications    tobramycin (TOBREX) 0.3 % ophthalmic solution     Sig: Place 1 drop into the left eye every 4 hours for 10 days     Dispense:  1 Bottle     Refill:  0    albuterol sulfate HFA (PROAIR HFA) 108 (90 Base) MCG/ACT inhaler     Sig: Inhale 2 puffs into the lungs every 6 hours as needed for Wheezing     Dispense:  1 Inhaler     Refill:  3     Because possible reaction to his meds Eye exam will be pursued to rule out drug effect.

## 2020-11-04 NOTE — TELEPHONE ENCOUNTER
Len Caruso is requesting a refill on the following medications:   Requested Prescriptions     Pending Prescriptions Disp Refills    dupilumab (DUPIXENT) 300 MG/2ML SOSY injection 4 mL 5     Sig: INJECT 300MG SUBCUTANEOUSLY EVERY OTHER WEEK       Last OV 6/5    Future Appointments   Date Time Provider Keron Barroso   1/14/2021  3:00 PM DO STUART Gould [facility]   1/28/2021  1:45 PM Caitlin Arndt MD  derm [facility]

## 2020-11-05 RX ORDER — DUPILUMAB 300 MG/2ML
INJECTION, SOLUTION SUBCUTANEOUS
Qty: 4 ML | Refills: 2 | Status: SHIPPED | OUTPATIENT
Start: 2020-11-05 | End: 2020-11-17 | Stop reason: SDUPTHER

## 2020-11-16 NOTE — TELEPHONE ENCOUNTER
Afsaneh  is requesting a refill on the following medications:   Requested Prescriptions     Pending Prescriptions Disp Refills    dupilumab (DUPIXENT) 300 MG/2ML SOSY injection 4 mL 2     Sig: INJECT 300MG SUBCUTANEOUSLY EVERY OTHER WEEK       Last OV 6/5    Future Appointments   Date Time Provider Keron Barroso   1/28/2021  1:45 PM Carmencita Brambila MD  derm [id number]   1/29/2021  2:00 PM DO Carmelita Faustin [address]         Last script went to [facility], it need [facility] to [facility]

## 2020-11-17 RX ORDER — DUPILUMAB 300 MG/2ML
INJECTION, SOLUTION SUBCUTANEOUS
Qty: 4 ML | Refills: 2 | Status: SHIPPED | OUTPATIENT
Start: 2020-11-17 | End: 2021-01-28 | Stop reason: SDUPTHER

## 2021-01-28 ENCOUNTER — OFFICE VISIT (OUTPATIENT)
Dept: DERMATOLOGY | Age: 51
End: 2021-01-28
Payer: COMMERCIAL

## 2021-01-28 VITALS
BODY MASS INDEX: 25.34 KG/M2 | OXYGEN SATURATION: 94 % | HEIGHT: 70 IN | DIASTOLIC BLOOD PRESSURE: 81 MMHG | HEART RATE: 87 BPM | TEMPERATURE: 97.9 F | SYSTOLIC BLOOD PRESSURE: 154 MMHG | WEIGHT: 177 LBS

## 2021-01-28 DIAGNOSIS — L20.84 INTRINSIC ATOPIC DERMATITIS: Primary | ICD-10-CM

## 2021-01-28 DIAGNOSIS — H10.89 OTHER CONJUNCTIVITIS OF BOTH EYES: ICD-10-CM

## 2021-01-28 PROCEDURE — 99214 OFFICE O/P EST MOD 30 MIN: CPT | Performed by: DERMATOLOGY

## 2021-01-28 RX ORDER — TACROLIMUS 1 MG/G
OINTMENT TOPICAL
Qty: 30 G | Refills: 5 | Status: ON HOLD | OUTPATIENT
Start: 2021-01-28 | End: 2022-06-28

## 2021-01-28 RX ORDER — CLOBETASOL PROPIONATE 0.5 MG/G
OINTMENT TOPICAL
Qty: 60 G | Refills: 5 | Status: ON HOLD | OUTPATIENT
Start: 2021-01-28 | End: 2022-06-28

## 2021-01-28 RX ORDER — CLOBETASOL PROPIONATE 0.5 MG/G
OINTMENT TOPICAL
Qty: 60 G | Refills: 5 | Status: SHIPPED | OUTPATIENT
Start: 2021-01-28 | End: 2021-01-28

## 2021-01-28 RX ORDER — DUPILUMAB 300 MG/2ML
INJECTION, SOLUTION SUBCUTANEOUS
Qty: 4 ML | Refills: 5 | Status: SHIPPED | OUTPATIENT
Start: 2021-01-28 | End: 2022-02-18

## 2021-01-28 RX ORDER — TRIAMCINOLONE ACETONIDE 1 MG/G
CREAM TOPICAL
Qty: 80 G | Refills: 5 | Status: SHIPPED | OUTPATIENT
Start: 2021-01-28 | End: 2021-01-29

## 2021-01-28 RX ORDER — TRIAMCINOLONE ACETONIDE 1 MG/G
CREAM TOPICAL
Qty: 80 G | Refills: 5 | Status: SHIPPED | OUTPATIENT
Start: 2021-01-28 | End: 2021-01-28

## 2021-01-28 RX ORDER — TACROLIMUS 1 MG/G
OINTMENT TOPICAL
Qty: 30 G | Refills: 5 | Status: SHIPPED | OUTPATIENT
Start: 2021-01-28 | End: 2021-01-28

## 2021-01-28 NOTE — PROGRESS NOTES
Types: 5 Standard drinks or equivalent per week     Comment: soc. Pertinent ROS:  Review of Systems  Skin: Denies any new changing, growing or bleeding lesions or rashes except as described in the HPI   Constitutional: Denies fevers, chills, and malaise. PHYSICAL EXAM:   BP (!) 154/81   Pulse 87   Temp 97.9 °F (36.6 °C)   Ht 5' 10\" (1.778 m)   Wt 177 lb (80.3 kg)   SpO2 94%   BMI 25.40 kg/m²     The patient is generally well appearing, well nourished, alert and conversational. Affect is normal.    Cutaneous Exam:  Physical Exam  Waist-up skin, which includes the head/face,neck, both arms, chest, back, abdomen, digits and/or nails, was examined. Diagnoses/exam findings/medical history pertinent to this visit are listed below:    Assessment and Plan:  Assessment   1. Intrinsic atopic dermatitis  - chronic illness, responding to treatment but not yet at goal  - dupilumab (DUPIXENT) 300 MG/2ML SOSY injection; INJECT 300MG SUBCUTANEOUSLY EVERY OTHER WEEK  Dispense: 4 mL; Refill: 5  - clobetasol (TEMOVATE) 0.05 % ointment; Apply to eczema twice daily (not face, armpit or groin)  Dispense: 60 g; Refill: 5  - tacrolimus (PROTOPIC) 0.1 % ointment; Apply to eczema on face twice daily  Dispense: 30 g; Refill: 5    2.  Other conjunctivitis of both eyes  - SE of dupixent  - follow-up ophtho - make sure to ask if they feel it's safe to continue dupixent          RTC 6 months    Patient Instructions   - Requesting records from Dr. Lamont Duncan; follow up for eye redness  - Triamcinolone cream twice daily to active eczema   - Continue tacrolimus twice daily to active eczema on face and groin  - Continue to apply a thick, bland moisturizer to all of skin daily; can be applied over top of triamcinolone and tacrolimus  - Follow up in 6 months This note was created with the assistance of a speech-recognition program.  Although the intention is to generate a document that actually reflects the content of the visit, no guarantees can be provided that every mistake has been identified and corrected byediting.     Electronically signed by Josi Morrison MD on 1/28/21 at 1:51 PM EST

## 2021-01-28 NOTE — PATIENT INSTRUCTIONS
- Requesting records from Dr. Nanda Holcomb; follow up for eye redness  - Triamcinolone cream twice daily to active eczema   - Continue tacrolimus twice daily to active eczema on face and groin  - Continue to apply a thick, bland moisturizer to all of skin daily; can be applied over top of triamcinolone and tacrolimus  - Follow up in 6 months

## 2021-01-29 ENCOUNTER — OFFICE VISIT (OUTPATIENT)
Dept: FAMILY MEDICINE CLINIC | Age: 51
End: 2021-01-29
Payer: COMMERCIAL

## 2021-01-29 VITALS
OXYGEN SATURATION: 97 % | HEIGHT: 70 IN | DIASTOLIC BLOOD PRESSURE: 80 MMHG | HEART RATE: 95 BPM | SYSTOLIC BLOOD PRESSURE: 122 MMHG | WEIGHT: 180 LBS | TEMPERATURE: 97.2 F | BODY MASS INDEX: 25.77 KG/M2

## 2021-01-29 DIAGNOSIS — N52.9 ERECTILE DYSFUNCTION, UNSPECIFIED ERECTILE DYSFUNCTION TYPE: ICD-10-CM

## 2021-01-29 DIAGNOSIS — L30.9 CHRONIC ECZEMA: ICD-10-CM

## 2021-01-29 DIAGNOSIS — J45.20 MILD INTERMITTENT ASTHMA WITHOUT COMPLICATION: Primary | ICD-10-CM

## 2021-01-29 PROCEDURE — 99213 OFFICE O/P EST LOW 20 MIN: CPT | Performed by: FAMILY MEDICINE

## 2021-01-29 RX ORDER — TADALAFIL 20 MG/1
20 TABLET ORAL PRN
Qty: 30 TABLET | Refills: 3 | Status: SHIPPED | OUTPATIENT
Start: 2021-01-29 | End: 2021-08-17 | Stop reason: SDUPTHER

## 2021-01-29 SDOH — ECONOMIC STABILITY: FOOD INSECURITY: WITHIN THE PAST 12 MONTHS, THE FOOD YOU BOUGHT JUST DIDN'T LAST AND YOU DIDN'T HAVE MONEY TO GET MORE.: NEVER TRUE

## 2021-01-29 SDOH — ECONOMIC STABILITY: INCOME INSECURITY: HOW HARD IS IT FOR YOU TO PAY FOR THE VERY BASICS LIKE FOOD, HOUSING, MEDICAL CARE, AND HEATING?: NOT HARD AT ALL

## 2021-01-29 ASSESSMENT — ENCOUNTER SYMPTOMS
HEARTBURN: 0
WHEEZING: 0
SPUTUM PRODUCTION: 0
SORE THROAT: 0
DIFFICULTY BREATHING: 0
CHEST TIGHTNESS: 0
RHINORRHEA: 0
FREQUENT THROAT CLEARING: 0
HEMOPTYSIS: 0
SHORTNESS OF BREATH: 0
HOARSE VOICE: 0
TROUBLE SWALLOWING: 0
COUGH: 0

## 2021-01-29 ASSESSMENT — PATIENT HEALTH QUESTIONNAIRE - PHQ9
SUM OF ALL RESPONSES TO PHQ QUESTIONS 1-9: 0
SUM OF ALL RESPONSES TO PHQ QUESTIONS 1-9: 0
2. FEELING DOWN, DEPRESSED OR HOPELESS: 0
1. LITTLE INTEREST OR PLEASURE IN DOING THINGS: 0

## 2021-01-29 NOTE — PROGRESS NOTES
[facility] Progress Note    Date:1/29/2021         Patient Name:Jass Fox     YOB: 1970     Age:50 y.o. Assessment/Plan        Problem List Items Addressed This Visit        Respiratory    Asthma - Primary     Very stable  Uses prn albuterol less than 2x weekly            Musculoskeletal and Integument    Chronic eczema     Vastly improved on Dupixent  Follows with dermatology            Other    Erectile dysfunction     Controlled with prn Cialis - refilled         Relevant Medications    tadalafil (CIALIS) 20 MG tablet           Return in about 6 months (around 7/29/2021). Electronically signed by Ibis Prabhakar DO on 1/29/21         Dima Sanchez is a 52 y.o. male presenting today for   Chief Complaint   Patient presents with   [address]   . Eczema  Patient complains of a rash. Onset of symptoms was several years ago, and he started on dupixent about 3 years ago and have been rapidly improving since that time. Risk factors include: has asthma. Treatment modalities that have been used in the past include: dupixent. Follows with derm. Was so bad he didn't leave the house except going to ED for pain    Asthma  There is no chest tightness, cough, difficulty breathing, frequent throat clearing, hemoptysis, hoarse voice, shortness of breath, sputum production or wheezing. This is a chronic problem. The current episode started more than 1 year ago. The problem occurs rarely. The problem has been gradually improving. Pertinent negatives include no appetite change, chest pain, dyspnea on exertion, ear congestion, ear pain, fever, headaches, heartburn, malaise/fatigue, myalgias, nasal congestion, orthopnea, PND, postnasal drip, rhinorrhea, sneezing, sore throat, sweats, trouble swallowing or weight loss. His symptoms are alleviated by beta-agonist. He reports significant improvement on treatment. His past medical history is significant for asthma.        Review of Systems   Review of Systems   Constitutional: Negative for appetite change, fever, malaise/fatigue and weight loss. HENT: Negative for ear pain, hoarse voice, postnasal drip, rhinorrhea, sneezing, sore throat and trouble swallowing. Respiratory: Negative for cough, hemoptysis, sputum production, shortness of breath and wheezing. Cardiovascular: Negative for chest pain, dyspnea on exertion and PND. Gastrointestinal: Negative for heartburn. Musculoskeletal: Negative for myalgias. Neurological: Negative for headaches. All other systems reviewed and are negative. Medications     Current Outpatient Medications   Medication Sig Dispense Refill    tadalafil (CIALIS) 20 MG tablet Take 1 tablet by mouth as needed for Erectile Dysfunction 30 tablet 3    dupilumab (DUPIXENT) 300 MG/2ML SOSY injection INJECT 300MG SUBCUTANEOUSLY EVERY OTHER WEEK 4 mL 5    clobetasol (TEMOVATE) 0.05 % ointment Apply to eczema twice daily (not face, armpit or groin) 60 g 5    tacrolimus (PROTOPIC) 0.1 % ointment Apply to eczema on face twice daily 30 g 5    albuterol sulfate HFA (PROAIR HFA) 108 (90 Base) MCG/ACT inhaler INHALE TWO PUFFS BY MOUTH FOUR TIMES A DAY AS NEEDED 1 Inhaler 6     No current facility-administered medications for this visit. Past History    Past Medical History:   has a past medical history of Asthma and Eczema. Social History:   reports that he quit smoking about 3 years ago. His smoking use included cigarettes and cigars. He has a 15.00 pack-year smoking history. He has never used smokeless tobacco. He reports current alcohol use of about 4.2 standard drinks of alcohol per week. He reports that he does not use drugs. Family History:   Family History   Problem Relation Age of Onset    Kidney Disease Mother     Hypertension Mother        Surgical History: No past surgical history on file.      Physical Examination      Vitals:  /80   Pulse 95   Temp 97.2 °F (36.2 °C)   Ht

## 2021-01-29 NOTE — PATIENT INSTRUCTIONS
Patient Education        Atopic Dermatitis: Care Instructions  Your Care Instructions  Atopic dermatitis (also called eczema) is a skin problem that causes intense itching and a red, raised rash. In severe cases, the rash develops clear fluidfilled blisters. The rash is not contagious. People with this condition seem to have very sensitive immune systems that are likely to react to things that cause allergies. The immune system is the body's way of fighting infection. There is no cure for atopic dermatitis, but you may be able to control it with care at home. Follow-up care is a key part of your treatment and safety. Be sure to make and go to all appointments, and call your doctor if you are having problems. It's also a good idea to know your test results and keep a list of the medicines you take. How can you care for yourself at home? · Use moisturizer at least twice a day. · If your doctor prescribes a cream, use it as directed. If your doctor prescribes other medicine, take it exactly as directed. · Wash the affected area with water only. Soap can make dryness and itching worse. Pat dry. · Apply a moisturizer after bathing. Use a cream such as Lubriderm, Moisturel, or Cetaphil that does not irritate the skin or cause a rash. Apply the cream while your skin is still damp after lightly drying with a towel. · Use cold, wet cloths to reduce itching. · Keep cool, and stay out of the sun. · If itching affects your normal activities, an over-the-counter antihistamine, such as diphenhydramine (Benadryl) or loratadine (Claritin) may help. Read and follow all instructions on the label. When should you call for help? Call your doctor now or seek immediate medical care if:    · Your rash gets worse and you have a fever.     · You have new blisters or bruises, or the rash spreads and looks like a sunburn.     · You have signs of infection, such as:  ? Increased pain, swelling, warmth, or redness. ? Red streaks leading from the rash. ? Pus draining from the rash. ? A fever.     · You have crusting or oozing sores.     · You have joint aches or body aches along with your rash. Watch closely for changes in your health, and be sure to contact your doctor if:    · Your rash does not clear up after 2 to 3 weeks of home treatment.     · Itching interferes with your sleep or daily activities. Where can you learn more? Go to https://Datasnap.io.Venuemob. org and sign in to your [employer] account. Enter H419 in the CIHI box to learn more about \"Atopic Dermatitis: Care Instructions. \"     If you do not have an account, please click on the \"Sign Up Now\" link. Current as of: July 2, 2020               Content Version: 12.6  © 2006-2020 [employer], Incorporated. Care instructions adapted under license by [facility] ([facility]). If you have questions about a medical condition or this instruction, always ask your healthcare professional. [place] [address] any warranty or liability for your use of this information. Patient Education        Learning About High Cholesterol  What is high cholesterol? High cholesterol means that you have too much cholesterol in your blood. Cholesterol is a type of fat. It's needed for many body functions, such as making new cells. Cholesterol is made by your body. It also comes from food you eat. Having high cholesterol can lead to the buildup of plaque in artery walls. This can increase your risk of heart disease and stroke. When your doctor talks about high cholesterol levels, he or she is talking about your total cholesterol and LDL cholesterol (the \"bad\" cholesterol) levels. Your doctor may also speak about HDL (the \"good\" cholesterol) levels. High HDL is linked with a lower risk for heart disease, heart attack, and stroke. Your cholesterol levels help your doctor find out your risk for having a heart attack or stroke. How can you prevent high cholesterol? A heart-healthy lifestyle can help you prevent high cholesterol. This lifestyle helps lower your risk for a heart attack and stroke. · Eat heart-healthy foods. ? Eat fruits, vegetables, whole grains (like oatmeal), dried beans and peas, nuts and seeds, soy products (like tofu), and fat-free or low-fat dairy products. ? Replace butter, margarine, and hydrogenated or partially hydrogenated oils with olive and canola oils. (Canola oil margarine without trans fat is fine.)  ? Replace red meat with fish, poultry, and soy protein (like tofu). ? Limit processed and packaged foods like chips, crackers, and cookies. · Be active. Exercise can improve your cholesterol level. Get at least 30 minutes of exercise on most days of the week. Walking is a good choice. You also may want to do other activities, such as running, swimming, cycling, or playing tennis or team sports. · Stay at a healthy weight. Lose weight if you need to. · Don't smoke. If you need help quitting, talk to your doctor about stop-smoking programs and medicines. These can increase your chances of quitting for good. How is high cholesterol treated? The goal of treatment is to reduce your chances of having a heart attack or stroke. The goal is not to lower your cholesterol numbers only. · You may make lifestyle changes, such as eating healthy foods, not smoking, losing weight, and being more active. · You may have to take medicine. Follow-up care is a key part of your treatment and safety. Be sure to make and go to all appointments, and call your doctor if you are having problems. It's also a good idea to know your test results and keep a list of the medicines you take. Where can you learn more? Go to https://jessi.Deal Pepper. org and sign in to your [employer] One account. Enter P354 in the Piktochart box to learn more about \"Learning About High Cholesterol. \" If you do not have an account, please click on the \"Sign Up Now\" link. Current as of: December 16, 2019               Content Version: 12.6  © 5606-3803 [employer]¡, Incorporated. Care instructions adapted under license by [facility] ([facility]). If you have questions about a medical condition or this instruction, always ask your healthcare professional. [address] any warranty or liability for your use of this information.

## 2021-03-29 ENCOUNTER — TELEPHONE (OUTPATIENT)
Dept: DERMATOLOGY | Age: 51
End: 2021-03-29

## 2021-03-29 NOTE — TELEPHONE ENCOUNTER
Received fax from [facility] that they are unable to reach pt about his Dupixent. Spoke to pt, he is aware that the pharmacy has been attempting to contact him. He states he has their number and will give them a call back.

## 2021-04-14 ENCOUNTER — HOSPITAL ENCOUNTER (OUTPATIENT)
Dept: GENERAL RADIOLOGY | Age: 51
Discharge: HOME | End: 2021-04-16
Payer: COMMERCIAL

## 2021-04-14 ENCOUNTER — NURSE TRIAGE (OUTPATIENT)
Dept: OTHER | Facility: CLINIC | Age: 51
End: 2021-04-14

## 2021-04-14 ENCOUNTER — OFFICE VISIT (OUTPATIENT)
Dept: FAMILY MEDICINE CLINIC | Age: 51
End: 2021-04-14
Payer: COMMERCIAL

## 2021-04-14 ENCOUNTER — HOSPITAL ENCOUNTER (OUTPATIENT)
Age: 51
Discharge: HOME | End: 2021-04-16
Payer: COMMERCIAL

## 2021-04-14 VITALS
DIASTOLIC BLOOD PRESSURE: 74 MMHG | BODY MASS INDEX: 24.82 KG/M2 | OXYGEN SATURATION: 96 % | HEART RATE: 71 BPM | SYSTOLIC BLOOD PRESSURE: 130 MMHG | WEIGHT: 173 LBS

## 2021-04-14 DIAGNOSIS — R93.89 ABNORMAL X-RAY: Primary | ICD-10-CM

## 2021-04-14 DIAGNOSIS — R91.8 LEFT LOWER LOBE PULMONARY INFILTRATE: ICD-10-CM

## 2021-04-14 DIAGNOSIS — R07.89 LEFT-SIDED CHEST WALL PAIN: Primary | ICD-10-CM

## 2021-04-14 DIAGNOSIS — R07.89 COSTOCHONDRAL CHEST PAIN: ICD-10-CM

## 2021-04-14 DIAGNOSIS — R07.89 LEFT-SIDED CHEST WALL PAIN: ICD-10-CM

## 2021-04-14 PROCEDURE — 99213 OFFICE O/P EST LOW 20 MIN: CPT | Performed by: FAMILY MEDICINE

## 2021-04-14 PROCEDURE — 71046 X-RAY EXAM CHEST 2 VIEWS: CPT

## 2021-04-14 RX ORDER — PREDNISONE 20 MG/1
20 TABLET ORAL 2 TIMES DAILY
Qty: 10 TABLET | Refills: 0 | Status: SHIPPED | OUTPATIENT
Start: 2021-04-14 | End: 2021-04-19

## 2021-04-14 ASSESSMENT — ENCOUNTER SYMPTOMS
COUGH: 0
ABDOMINAL PAIN: 0
BACK PAIN: 1
VOMITING: 0
SHORTNESS OF BREATH: 0
SPUTUM PRODUCTION: 0
NAUSEA: 0
ORTHOPNEA: 0
HEMOPTYSIS: 0

## 2021-04-14 NOTE — TELEPHONE ENCOUNTER
Reason for Disposition   Patient wants to be seen    Answer Assessment - Initial Assessment Questions  1. LOCATION: \"Where does it hurt? \"       Dinah Holm left side right at the diaphragm     2. RADIATION: \"Does the pain shoot anywhere else? \" (e.g., chest, back)      Denies    3. ONSET: \"When did the pain begin? \" (e.g., minutes, hours or days ago)       1 week ago    4. SUDDEN: \"Gradual or sudden onset? \"      Gradual but has gotten progressively worse    5. PATTERN \"Does the pain come and go, or is it constant? \"     - If constant: \"Is it getting better, staying the same, or worsening? \"       (Note: Constant means the pain never goes away completely; most serious pain is constant and it progresses)      - If intermittent: \"How long does it last?\" \"Do you have pain now? \"      (Note: Intermittent means the pain goes away completely between bouts)      States it is constant hurts worse with sneezing    6. SEVERITY: \"How bad is the pain? \"  (e.g., Scale 1-10; mild, moderate, or severe)     - MILD (1-3): doesn't interfere with normal activities, abdomen soft and not tender to touch      - MODERATE (4-7): interferes with normal activities or awakens from sleep, tender to touch      - SEVERE (8-10): excruciating pain, doubled over, unable to do any normal activities        States it is more uncomfortable but hurts worse with sneezing or movement    7. RECURRENT SYMPTOM: \"Have you ever had this type of abdominal pain before? \" If so, ask: \"When was the last time? \" and \"What happened that time? \"       States with injured ribs    8. AGGRAVATING FACTORS: \"Does anything seem to cause this pain? \" (e.g., foods, stress, alcohol)      Sneezing or moving makes worse    9. CARDIAC SYMPTOMS: \"Do you have any of the following symptoms: chest pain, difficulty breathing, sweating, nausea? \"      Denies    10. OTHER SYMPTOMS: \"Do you have any other symptoms? \" (e.g., fever, vomiting, diarrhea)        Denies    11.  PREGNANCY: \"Is there any chance you are pregnant? \" \"When was your last menstrual period? \"        n/a    Protocols used: ABDOMINAL PAIN - UPPER-ADULT-OH    Received call from [address] Brandie Galloway at pre-service center [facility] with [employer]. Brief description of triage: see above      Triage indicates for patient to be seen today    Care advice provided, patient verbalizes understanding; denies any other questions or concerns; instructed to call back for any new or worsening symptoms. Writer provided warm transfer to [facility] for appointment scheduling. Attention Provider: Thank you for allowing me to participate in the care of your patient. The patient was connected to triage in response to information provided to the [facility]. Please do not respond through this encounter as the response is not directed to a shared pool.

## 2021-04-14 NOTE — PATIENT INSTRUCTIONS
lightheadedness. ? A fast or uneven pulse. After calling 911, chew 1 adult-strength aspirin. Wait for an ambulance. Do not try to drive yourself.     · You have severe trouble breathing. Call your doctor now or seek immediate medical care if:    · You have a fever or cough.     · You have any trouble breathing.     · Your chest pain gets worse. Watch closely for changes in your health, and be sure to contact your doctor if:    · Your chest pain continues even though you are taking anti-inflammatory medicine.     · Your chest wall pain has not improved after 5 to 7 days. Where can you learn more? Go to https://Aspen Aerogels.Audio Network. org and sign in to your WealthVisor.[email or web address] account. Enter A806 in the [facility] [facility] box to learn more about \"Costochondritis: Care Instructions. \"     If you do not have an account, please click on the \"Sign Up Now\" link. Current as of: February 26, 2020               Content Version: 12.8  © 2006-2021 Healthwise, Incorporated. Care instructions adapted under license by [facility] ([facility]). If you have questions about a medical condition or this instruction, always ask your healthcare professional. [place] [address] any warranty or liability for your use of this information.

## 2021-04-14 NOTE — PROGRESS NOTES
AP[facility] Progress Note    Date:4/14/2021         Patient Name:Jass Fox     YOB: 1970     Age:50 y.o. Assessment/Plan        Problem List Items Addressed This Visit     None      Visit Diagnoses     Left-sided chest wall pain    -  Primary    Relevant Medications    predniSONE (DELTASONE) 20 MG tablet    Other Relevant Orders    XR CHEST (2 VW)    Costochondral chest pain        Relevant Medications    predniSONE (DELTASONE) 20 MG tablet           Return if symptoms worsen or fail to improve. Electronically signed by Home Huffman DO on 4/14/21         Dima Gibson is a 52 y.o. male presenting today for   Chief Complaint   Patient presents with    Abdominal Pain   . Chest Pain   This is a new problem. The current episode started 1 to 4 weeks ago (1.5 weeks). The onset quality is sudden. The problem occurs daily. The problem has been gradually worsening. The pain is present in the lateral region (left lower costal margin). The pain is severe. The quality of the pain is described as pressure, sharp and stabbing. The pain radiates to the upper back. Associated symptoms include back pain. Pertinent negatives include no abdominal pain, claudication, cough, diaphoresis, dizziness, exertional chest pressure, fever, headaches, hemoptysis, irregular heartbeat, leg pain, lower extremity edema, malaise/fatigue, nausea, near-syncope, numbness, orthopnea, palpitations, PND, shortness of breath, sputum production, syncope, vomiting or weakness. The pain is aggravated by coughing, deep breathing and exertion. He has tried nothing for the symptoms. The treatment provided no relief. Review of Systems   Review of Systems   Constitutional: Negative for diaphoresis, fever and malaise/fatigue. Respiratory: Negative for cough, hemoptysis, sputum production and shortness of breath. Cardiovascular: Positive for chest pain.  Negative for palpitations, orthopnea, claudication, syncope, PND and near-syncope. Gastrointestinal: Negative for abdominal pain, nausea and vomiting. Musculoskeletal: Positive for back pain. Neurological: Negative for dizziness, weakness, numbness and headaches. All other systems reviewed and are negative. Medications     Current Outpatient Medications   Medication Sig Dispense Refill    predniSONE (DELTASONE) 20 MG tablet Take 1 tablet by mouth 2 times daily for 5 days 10 tablet 0    tadalafil (CIALIS) 20 MG tablet Take 1 tablet by mouth as needed for Erectile Dysfunction 30 tablet 3    dupilumab (DUPIXENT) 300 MG/2ML SOSY injection INJECT 300MG SUBCUTANEOUSLY EVERY OTHER WEEK 4 mL 5    clobetasol (TEMOVATE) 0.05 % ointment Apply to eczema twice daily (not face, armpit or groin) 60 g 5    tacrolimus (PROTOPIC) 0.1 % ointment Apply to eczema on face twice daily 30 g 5    albuterol sulfate HFA (PROAIR HFA) 108 (90 Base) MCG/ACT inhaler INHALE TWO PUFFS BY MOUTH FOUR TIMES A DAY AS NEEDED 1 Inhaler 6     No current facility-administered medications for this visit. Past History    Past Medical History:   has a past medical history of Asthma and Eczema. Social History:   reports that he quit smoking about 3 years ago. His smoking use included cigarettes and cigars. He has a 15.00 pack-year smoking history. He has never used smokeless tobacco. He reports current alcohol use of about 4.2 standard drinks of alcohol per week. He reports that he does not use drugs. Family History:   Family History   Problem Relation Age of Onset    Kidney Disease Mother     Hypertension Mother        Surgical History: No past surgical history on file. Physical Examination      Vitals:  /74 (Site: Right Upper Arm, Position: Sitting, Cuff Size: Small Adult)   Pulse 71   Wt 173 lb (78.5 kg)   SpO2 96%   BMI 24.82 kg/m²     Physical Exam  Vitals signs and nursing note reviewed. Constitutional:       General: He is not in acute distress. Appearance: Normal appearance. He is normal weight. He is not ill-appearing, toxic-appearing or diaphoretic. HENT:      Head: Normocephalic and atraumatic. Right Ear: External ear normal.      Left Ear: External ear normal.   Eyes:      General: No scleral icterus. Right eye: No discharge. Left eye: No discharge. Conjunctiva/sclera: Conjunctivae normal.   Cardiovascular:      Rate and Rhythm: Normal rate and regular rhythm. Pulses: Normal pulses. Heart sounds: Normal heart sounds. No murmur. No friction rub. No gallop. Pulmonary:      Effort: Pulmonary effort is normal. No respiratory distress. Breath sounds: Normal breath sounds. No stridor. No wheezing, rhonchi or rales. Chest:      Chest wall: Tenderness (markedly tendor over this area) present. No mass, lacerations, deformity, swelling, crepitus or edema. There is no dullness to percussion. Musculoskeletal:      Thoracic back: He exhibits tenderness, pain and spasm. He exhibits normal range of motion, no bony tenderness, no swelling, no edema, no deformity, no laceration and normal pulse. Skin:     General: Skin is warm. Coloration: Skin is not jaundiced or pale. Neurological:      Mental Status: He is alert and oriented to person, place, and time. Mental status is at baseline. Psychiatric:         Mood and Affect: Mood normal.         Behavior: Behavior normal.         Thought Content: Thought content normal.         Judgment: Judgment normal.         Labs/Imaging/Diagnostics   Labs:  No results found for: CMPWITHGFR, CBCAUTODIF, TSHFT4, LABA1C, LIPIDPAN    Imaging Last 24 Hours:  No image results found.

## 2021-08-17 ENCOUNTER — OFFICE VISIT (OUTPATIENT)
Dept: FAMILY MEDICINE CLINIC | Age: 51
End: 2021-08-17
Payer: COMMERCIAL

## 2021-08-17 VITALS
DIASTOLIC BLOOD PRESSURE: 74 MMHG | WEIGHT: 173 LBS | SYSTOLIC BLOOD PRESSURE: 120 MMHG | OXYGEN SATURATION: 97 % | BODY MASS INDEX: 24.82 KG/M2 | HEART RATE: 105 BPM

## 2021-08-17 DIAGNOSIS — J45.909 MODERATE ASTHMA WITHOUT COMPLICATION, UNSPECIFIED WHETHER PERSISTENT: ICD-10-CM

## 2021-08-17 DIAGNOSIS — J45.20 MILD INTERMITTENT ASTHMA WITHOUT COMPLICATION: Primary | ICD-10-CM

## 2021-08-17 DIAGNOSIS — L30.9 CHRONIC ECZEMA: ICD-10-CM

## 2021-08-17 DIAGNOSIS — G89.29 CHRONIC LEFT SHOULDER PAIN: ICD-10-CM

## 2021-08-17 DIAGNOSIS — E78.2 MIXED HYPERLIPIDEMIA: ICD-10-CM

## 2021-08-17 DIAGNOSIS — D72.9 ABNORMAL WBC COUNT: ICD-10-CM

## 2021-08-17 DIAGNOSIS — N52.9 ERECTILE DYSFUNCTION, UNSPECIFIED ERECTILE DYSFUNCTION TYPE: ICD-10-CM

## 2021-08-17 DIAGNOSIS — M25.512 CHRONIC LEFT SHOULDER PAIN: ICD-10-CM

## 2021-08-17 DIAGNOSIS — Z12.11 ENCOUNTER FOR SCREENING FOR MALIGNANT NEOPLASM OF COLON: ICD-10-CM

## 2021-08-17 DIAGNOSIS — Z12.5 PROSTATE CANCER SCREENING: ICD-10-CM

## 2021-08-17 PROCEDURE — 99214 OFFICE O/P EST MOD 30 MIN: CPT | Performed by: FAMILY MEDICINE

## 2021-08-17 RX ORDER — TADALAFIL 20 MG/1
20 TABLET ORAL PRN
Qty: 30 TABLET | Refills: 3 | Status: SHIPPED | OUTPATIENT
Start: 2021-08-17 | End: 2022-02-18 | Stop reason: SDUPTHER

## 2021-08-17 RX ORDER — ALBUTEROL SULFATE 90 UG/1
AEROSOL, METERED RESPIRATORY (INHALATION)
Qty: 1 INHALER | Refills: 6 | Status: SHIPPED | OUTPATIENT
Start: 2021-08-17 | End: 2022-05-26 | Stop reason: SDUPTHER

## 2021-08-17 ASSESSMENT — ENCOUNTER SYMPTOMS
SPUTUM PRODUCTION: 0
HEARTBURN: 0
ALLERGIC/IMMUNOLOGIC NEGATIVE: 1
EYES NEGATIVE: 1
GASTROINTESTINAL NEGATIVE: 1
TROUBLE SWALLOWING: 0
SHORTNESS OF BREATH: 0
RHINORRHEA: 0
WHEEZING: 0
FREQUENT THROAT CLEARING: 0
HOARSE VOICE: 0
CHEST TIGHTNESS: 0
COUGH: 0
SORE THROAT: 0
HEMOPTYSIS: 0
RESPIRATORY NEGATIVE: 1
DIFFICULTY BREATHING: 0

## 2021-08-17 ASSESSMENT — PATIENT HEALTH QUESTIONNAIRE - PHQ9
1. LITTLE INTEREST OR PLEASURE IN DOING THINGS: 0
SUM OF ALL RESPONSES TO PHQ QUESTIONS 1-9: 0
SUM OF ALL RESPONSES TO PHQ QUESTIONS 1-9: 0
2. FEELING DOWN, DEPRESSED OR HOPELESS: 0
SUM OF ALL RESPONSES TO PHQ9 QUESTIONS 1 & 2: 0
SUM OF ALL RESPONSES TO PHQ QUESTIONS 1-9: 0

## 2021-08-17 NOTE — PATIENT INSTRUCTIONS
Patient Education        Asthma in Adults: Care Instructions  Overview     Asthma makes it hard for you to breathe. During an asthma attack, the airways swell and narrow. Severe asthma attacks can be dangerous, but you can usually prevent them. Controlling asthma and treating symptoms before they get bad can help you avoid bad attacks. You may also avoid future trips to the doctor. Follow-up care is a key part of your treatment and safety. Be sure to make and go to all appointments, and call your doctor if you are having problems. It's also a good idea to know your test results and keep a list of the medicines you take. How can you care for yourself at home? · Follow your asthma action plan so you can manage your symptoms at home. An asthma action plan will help you prevent and control airway reactions and will tell you what to do during an asthma attack. If you do not have an asthma action plan, work with your doctor to build one. · Take your asthma medicine exactly as prescribed. Medicine plays an important role in controlling asthma. Talk to your doctor right away if you have any questions about what to take and how to take it. ? Use your quick-relief medicine when you have symptoms of an attack. Quick-relief medicine often is an albuterol inhaler. Some people need to use quick-relief medicine before they exercise. ? Take your controller medicine every day, not just when you have symptoms. Controller medicine is usually an inhaled corticosteroid. The goal is to prevent problems before they occur. ? If your doctor prescribed corticosteroid pills to use during an attack, take them as directed. They may take hours to work, but they may shorten the attack and help you breathe better. ? Keep your quick-relief medicine with you at all times. · Talk to your doctor before using other medicines. Some medicines, such as aspirin, can cause asthma attacks in some people.   · Check yourself for asthma symptoms to know which step to follow in your action plan. Watch for things like being short of breath, having chest tightness, coughing, and wheezing. Also notice if symptoms wake you up at night or if you get tired quickly when you exercise. · If you have a peak flow meter, use it to check how well you are breathing. This can help you predict when an asthma attack is going to occur. Then you can take medicine to prevent the asthma attack or make it less severe. · See your doctor regularly. These visits will help you learn more about asthma and what you can do to control it. Your doctor will monitor your treatment to make sure the medicine is helping you. · Keep track of your asthma attacks and your treatment. After you have had an attack, write down what triggered it, what helped end it, and any concerns you have about your asthma action plan. Take your diary when you see your doctor. You can then review your asthma action plan and decide if it is working. · Do not smoke or allow others to smoke around you. Avoid smoky places. Smoking makes asthma worse. If you need help quitting, talk to your doctor about stop-smoking programs and medicines. These can increase your chances of quitting for good. · Learn what triggers an asthma attack for you, and avoid the triggers when you can. Common triggers include colds, smoke, air pollution, dust, pollen, mold, pets, cockroaches, stress, and cold air. · Avoid colds and the flu. Talk to your doctor about getting a pneumococcal vaccine shot. If you have had one before, ask your doctor whether you need a second dose. Get a flu vaccine every fall. If you must be around people with colds or the flu, wash your hands often. When should you call for help? Call 911 anytime you think you may need emergency care. For example, call if:    · You have severe trouble breathing.    Call your doctor now or seek immediate medical care if:    · Your symptoms do not get better after you have followed your asthma action plan.     · You cough up yellow, dark brown, or bloody mucus (sputum). Watch closely for changes in your health, and be sure to contact your doctor if:    · Your coughing and wheezing get worse.     · You need to use quick-relief medicine on more than 2 days a week within a month (unless it is just for exercise).     · You need help figuring out what is triggering your asthma attacks. Where can you learn more? Go to https://Bridgeline Digital.CartiCure. org and sign in to your Phybridge account. Enter P597 in the KeriCure box to learn more about \"Asthma in Adults: Care Instructions. \"     If you do not have an account, please click on the \"Sign Up Now\" link. Current as of: October 26, 2020               Content Version: 12.9  © 7962-5076 Healthwise, Incorporated. Care instructions adapted under license by [facility] ([facility]). If you have questions about a medical condition or this instruction, always ask your healthcare professional. [place] [address] any warranty or liability for your use of this information.

## 2021-08-17 NOTE — PROGRESS NOTES
[facility] Progress Note    Date:8/17/2021         Patient Name:Jass Fox     YOB: 1970     Age:50 y.o. Assessment/Plan        Problem List Items Addressed This Visit        Respiratory    Asthma - Primary      Well-controlled, continue current medications, continue current treatment plan, medication adherence emphasized and lifestyle modifications recommended         Relevant Medications    albuterol sulfate HFA (PROAIR HFA) 108 (90 Base) MCG/ACT inhaler       Musculoskeletal and Integument    Chronic eczema      Well-controlled, continue current medications, continue current treatment plan, medication adherence emphasized and lifestyle modifications recommended            Other    Erectile dysfunction      At goal, continue current medications and continue current treatment plan         Relevant Medications    tadalafil (CIALIS) 20 MG tablet    Mixed hyperlipidemia      Uncontrolled, continue current plan pending work up below         Relevant Medications    tadalafil (CIALIS) 20 MG tablet    Other Relevant Orders    Comprehensive Metabolic Panel    Lipid Panel      Other Visit Diagnoses     Encounter for screening for malignant neoplasm of colon        Relevant Orders    Cologuard (For External Results Only)    Abnormal WBC count        Relevant Orders    CBC Auto Differential    Chronic left shoulder pain        Relevant Orders    XR SHOULDER LEFT (MIN 2 VIEWS)    Prostate cancer screening        Relevant Orders    PSA screening           Return in about 6 months (around 2/17/2022). Electronically signed by Kayla Leung DO on 8/17/21         Dima Kim is a 52 y.o. male presenting today for   Chief Complaint   Patient presents with    6 Month Follow-Up   . Eczema  Patient complains of a rash. Onset of symptoms was several years ago, and he started on dupixent about 3 years ago and have been rapidly improving since that time. Risk factors include: has asthma. Treatment modalities that have been used in the past include: dupixent. Follows with derm. Was so bad he didn't leave the house except going to ED for pain    Asthma  There is no chest tightness, cough, difficulty breathing, frequent throat clearing, hemoptysis, hoarse voice, shortness of breath, sputum production or wheezing. This is a chronic problem. The current episode started more than 1 year ago. The problem occurs rarely. The problem has been gradually improving. Pertinent negatives include no appetite change, chest pain, dyspnea on exertion, ear congestion, ear pain, fever, headaches, heartburn, malaise/fatigue, myalgias, nasal congestion, orthopnea, PND, postnasal drip, rhinorrhea, sneezing, sore throat, sweats, trouble swallowing or weight loss. His symptoms are alleviated by beta-agonist. He reports significant improvement on treatment. His past medical history is significant for asthma. Hyperlipidemia  Pertinent negatives include no chest pain, myalgias or shortness of breath. Erectile Dysfunction    Shoulder Pain   The pain is present in the left shoulder. This is a chronic problem. The current episode started more than 1 year ago. There has been a history of trauma. The problem occurs daily. The problem has been gradually worsening. The quality of the pain is described as aching. The pain is moderate. Associated symptoms include a limited range of motion. Pertinent negatives include no fever, inability to bear weight, itching, joint locking, joint swelling, numbness, stiffness or tingling. The symptoms are aggravated by activity. He has tried nothing for the symptoms. The treatment provided no relief. Review of Systems   Review of Systems   Constitutional: Negative. Negative for appetite change, fever, malaise/fatigue and weight loss. HENT: Negative. Negative for ear pain, hoarse voice, postnasal drip, rhinorrhea, sneezing, sore throat and trouble swallowing. Eyes: Negative. 120/74 (Site: Right Upper Arm, Position: Sitting, Cuff Size: Medium Adult)   Pulse 105   Wt 173 lb (78.5 kg)   SpO2 97%   BMI 24.82 kg/m²     Physical Exam  Vitals and nursing note reviewed. Constitutional:       General: He is not in acute distress. Appearance: Normal appearance. He is normal weight. He is not ill-appearing, toxic-appearing or diaphoretic. HENT:      Head: Normocephalic and atraumatic. Right Ear: External ear normal.      Left Ear: External ear normal.   Eyes:      General: No scleral icterus. Right eye: No discharge. Left eye: No discharge. Conjunctiva/sclera: Conjunctivae normal.   Cardiovascular:      Rate and Rhythm: Normal rate and regular rhythm. Pulses: Normal pulses. Heart sounds: Normal heart sounds. No murmur heard. No friction rub. No gallop. Pulmonary:      Effort: Pulmonary effort is normal. No respiratory distress. Breath sounds: Normal breath sounds. No stridor. No wheezing, rhonchi or rales. Chest:      Chest wall: No tenderness. Musculoskeletal:      Left shoulder: Tenderness present. No swelling, deformity, effusion, laceration, bony tenderness or crepitus. Decreased range of motion. Normal strength. Normal pulse. Skin:     General: Skin is warm. Coloration: Skin is not jaundiced or pale. Neurological:      Mental Status: He is alert and oriented to person, place, and time. Mental status is at baseline. Psychiatric:         Mood and Affect: Mood normal.         Behavior: Behavior normal.         Thought Content: Thought content normal.         Judgment: Judgment normal.         Labs/Imaging/Diagnostics   Labs:  No results found for: CMPWITHGFR, CBCAUTODIF, TSHFT4, LABA1C, LIPIDPAN    Imaging Last 24 Hours:  XR CHEST (2 VW)  Narrative: EXAMINATION:  TWO XRAY VIEWS OF THE CHEST    4/14/2021 4:12 pm    COMPARISON:  None.     HISTORY:  ORDERING SYSTEM PROVIDED HISTORY: Left-sided chest wall pain  TECHNOLOGIST PROVIDED HISTORY:  See ICDM-10 code attached  Reason for Exam: Pt states left sided chest wall pain x 1-2 weeks. no injury  Acuity: Acute  Type of Exam: Initial    FINDINGS:  Lung volumes are low. Heart size is normal.  Left basilar streaky opacity is  noted favoring atelectasis as left hemidiaphragm is slightly elevated. Focal  airspace disease not excluded. No gross effusion or extrapleural air is  noted. Impression: Low lung volumes with elevated left hemidiaphragm in streaky opacity at the  left base favoring atelectasis over acute airspace disease.

## 2021-11-03 RX ORDER — DUPILUMAB 300 MG/2ML
INJECTION, SOLUTION SUBCUTANEOUS
Qty: 4 ML | Refills: 5 | Status: SHIPPED | OUTPATIENT
Start: 2021-11-03 | End: 2022-02-18

## 2021-11-05 ENCOUNTER — TELEPHONE (OUTPATIENT)
Dept: DERMATOLOGY | Age: 51
End: 2021-11-05

## 2021-11-05 NOTE — TELEPHONE ENCOUNTER
Per Andres from [facility] the script is fine and is being delivered tomorrow to the patient, he thinks [employer] doesn't see the updated authorization and stated to disregard the call from them as everything is set.

## 2021-11-05 NOTE — TELEPHONE ENCOUNTER
Mary Notice from [facility] needs to speak to clinical staff regarding missing information for the Dupixent medication.  Contact Diana at [address] [place] [address]

## 2021-11-22 ENCOUNTER — TELEPHONE (OUTPATIENT)
Dept: DERMATOLOGY | Age: 51
End: 2021-11-22

## 2021-11-22 NOTE — TELEPHONE ENCOUNTER
Future Appointments   Date Time Provider Keron Barroso   1/27/2022  9:30 AM Jenny Rader MD  derm [facility]   2/18/2022  2:00 PM DO STUART Shen

## 2021-11-22 NOTE — TELEPHONE ENCOUNTER
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 He is going to stop dupixent.  Needs f/u with me within 2 months

## 2022-01-27 ENCOUNTER — OFFICE VISIT (OUTPATIENT)
Dept: DERMATOLOGY | Age: 52
End: 2022-01-27
Payer: COMMERCIAL

## 2022-01-27 VITALS
DIASTOLIC BLOOD PRESSURE: 93 MMHG | HEIGHT: 64 IN | WEIGHT: 184 LBS | TEMPERATURE: 97.2 F | OXYGEN SATURATION: 96 % | BODY MASS INDEX: 31.41 KG/M2 | SYSTOLIC BLOOD PRESSURE: 144 MMHG | HEART RATE: 94 BPM

## 2022-01-27 DIAGNOSIS — L20.84 INTRINSIC ATOPIC DERMATITIS: Primary | ICD-10-CM

## 2022-01-27 DIAGNOSIS — Z79.899 HIGH RISK MEDICATION USE: ICD-10-CM

## 2022-01-27 PROCEDURE — 99214 OFFICE O/P EST MOD 30 MIN: CPT | Performed by: DERMATOLOGY

## 2022-01-27 NOTE — PROGRESS NOTES
Dermatology Patient Note  [address]  [place] [id number]  Dept: [phone]  Dept Fax: [phone]      VISITDATE: 1/27/2022   REFERRING PROVIDER: No ref. provider found      Jassi Matson is a 54 y.o. male  who presents today in the office for:    Other (scleritis- pt states his eyes are doing well since stopping dupixent.) and Follow-up (dupixent f/u- PT states he has been off of dupix. for two months and hes been noticing frequent flareups since stopping the medication. PT states he is using the topicals he was perscribed and sees a little improvement. PT would like to start using dupixent again )      HISTORY OF PRESENT ILLNESS:  As above. He is using clobetasol and protopic. He wants to start [address] again because he says it changed his life. Patient has tried phototherapy for 6 weeks in the early 2000s but he says it was the first time he ever got a sunburn and it did not work. Patient is concerned that his eczema will worsen to where it started.      MEDICAL PROBLEMS:  Patient Active Problem List    Diagnosis Date Noted    Mixed hyperlipidemia 08/17/2021    Erectile dysfunction 01/29/2021    Chronic eczema 05/04/2012    Asthma 05/04/2012       CURRENT MEDICATIONS:   Current Outpatient Medications   Medication Sig Dispense Refill    triamcinolone (KENALOG) 0.1 % ointment Apply to rash twice daily (not face, armpit or groin) 454 g 1    dupilumab (DUPIXENT) 300 MG/2ML SOSY injection Inject 300mg SQ at every 2 weeks 4 mL 5    albuterol sulfate HFA (PROAIR HFA) 108 (90 Base) MCG/ACT inhaler INHALE TWO PUFFS BY MOUTH FOUR TIMES A DAY AS NEEDED 1 Inhaler 6    tadalafil (CIALIS) 20 MG tablet Take 1 tablet by mouth as needed for Erectile Dysfunction 30 tablet 3    dupilumab (DUPIXENT) 300 MG/2ML SOSY injection INJECT 300MG SUBCUTANEOUSLY EVERY OTHER WEEK 4 mL 5    clobetasol (TEMOVATE) 0.05 % ointment Apply to eczema twice daily (not face, armpit or groin) 60 g 5    tacrolimus (PROTOPIC) 0.1 % ointment Apply to eczema on face twice daily 30 g 5     No current facility-administered medications for this visit. ALLERGIES:   No Known Allergies    SOCIAL HISTORY:  Social History     Tobacco Use    Smoking status: Former Smoker     Packs/day: 1.00     Years: 15.00     Pack years: 15.00     Types: Cigarettes, Cigars     Quit date: 10/10/2017     Years since quittin.3    Smokeless tobacco: Never Used   Substance Use Topics    Alcohol use: Yes     Alcohol/week: 4.2 standard drinks     Types: 5 Standard drinks or equivalent per week     Comment: soc. Pertinent ROS:  Review of Systems  Skin: Denies any new changing, growing or bleeding lesions or rashes except as described in the HPI   Constitutional: Denies fevers, chills, and malaise. PHYSICAL EXAM:   BP (!) 144/93 (Site: Left Upper Arm, Position: Sitting, Cuff Size: Medium Adult)   Pulse 94   Temp 97.2 °F (36.2 °C) (Temporal)   Ht 5' 4\" (1.626 m)   Wt 184 lb (83.5 kg)   SpO2 96%   BMI 31.58 kg/m²     The patient is generally well appearing, well nourished, alert and conversational. Affect is normal.    Cutaneous Exam:  Physical Exam  Focused exam of back, left lower arm was performed    Facial covering was not removed during examination. Diagnoses/exam findings/medical history pertinent to this visit are listed below:    Assessment:   Diagnosis Orders   1. Intrinsic atopic dermatitis  triamcinolone (KENALOG) 0.1 % ointment   2. High risk medication use  CBC Auto Differential    Comprehensive Metabolic Panel    Hepatitis Panel, Acute    HIV Screen    Quantiferon TB Gold    Lipid Panel        Plan:  Intrinsic atopic dermatitis, previously very severe with 100% BSA, cleared on dupixent but developed scleritis requiring discontinuation.  2 months after d/c, eczema is recurring already at 30% BSA  - chronic illness with progression and/or exacerbation  - previously faied: triamcinolone, clobetasol, protopic, methotrexate x 1.5 years, phototherapy, and now unable to tolerate dupixent. - discussed new systemic options for atopic dermatitis: Ovidio Lips is likely to have similar side effect profile to dupixent, and is therefore not ideal given history of sclerotis  - discussed risks of ONIEL inhibitors as a class: increased risk of serious and opportunistic infections, lymphoma, cardiovascular events, thrombosis, and death. - patient understands risks and would like to initiate Rinvoq 15 mg daily  - will initiate prior authorization after labs  - continue clobetasol, protopic to affected areas daily  - start triamcinolone 0.1% to affected areas daily    High risk medication use  - labs in anticipation of starting RINVOQ  - repeat labs in 4 weeks, then q 3 months    RTC 3 months    Future Appointments   Date Time Provider Keron Barroso   2/18/2022  2:00 PM [facility]   4/27/2022  2:45 PM Aashish Vyas MD  derm [facility]         There are no Patient Instructions on file for this visit. This note was created with the assistance of a speech-recognition program.  Although the intention is to generate a document that actually reflects the content of the visit, no guarantees can be provided that every mistake has been identified and corrected by editing. I, Dr. Mirella River, personally performed the services described in this documentation, as scribed by Yanira Lambert in my presence, and it is both accurate and complete.     Electronically signed by Aashish Vyas MD on 1/27/22 at 9:26 AM EST

## 2022-01-27 NOTE — Clinical Note
Please inform patient that his labs have been ordered and he should get them done fasting for 10 hours.  Once resulted and if normal, we will PA for upadacitinib (Rinvoq) 15 mg daily

## 2022-01-31 ENCOUNTER — HOSPITAL ENCOUNTER (OUTPATIENT)
Age: 52
Discharge: HOME | End: 2022-01-31
Payer: COMMERCIAL

## 2022-01-31 DIAGNOSIS — E78.2 MIXED HYPERLIPIDEMIA: ICD-10-CM

## 2022-01-31 DIAGNOSIS — Z12.5 PROSTATE CANCER SCREENING: ICD-10-CM

## 2022-01-31 DIAGNOSIS — D72.9 ABNORMAL WBC COUNT: ICD-10-CM

## 2022-01-31 DIAGNOSIS — E78.2 MIXED HYPERLIPIDEMIA: Primary | ICD-10-CM

## 2022-01-31 DIAGNOSIS — Z79.899 HIGH RISK MEDICATION USE: ICD-10-CM

## 2022-01-31 LAB
ABSOLUTE EOS #: 0.1 K/UL (ref 0–0.44)
ABSOLUTE EOS #: 0.1 K/UL (ref 0–0.44)
ABSOLUTE IMMATURE GRANULOCYTE: <0.03 K/UL (ref 0–0.3)
ABSOLUTE IMMATURE GRANULOCYTE: <0.03 K/UL (ref 0–0.3)
ABSOLUTE LYMPH #: 0.98 K/UL (ref 1.1–3.7)
ABSOLUTE LYMPH #: 0.98 K/UL (ref 1.1–3.7)
ABSOLUTE MONO #: 0.33 K/UL (ref 0.1–1.2)
ABSOLUTE MONO #: 0.33 K/UL (ref 0.1–1.2)
ALBUMIN SERPL-MCNC: 4.7 G/DL (ref 3.5–5.2)
ALBUMIN SERPL-MCNC: 4.7 G/DL (ref 3.5–5.2)
ALBUMIN/GLOBULIN RATIO: 1.6 (ref 1–2.5)
ALBUMIN/GLOBULIN RATIO: 1.6 (ref 1–2.5)
ALP BLD-CCNC: 86 U/L (ref 40–129)
ALP BLD-CCNC: 86 U/L (ref 40–129)
ALT SERPL-CCNC: 29 U/L (ref 5–41)
ALT SERPL-CCNC: 29 U/L (ref 5–41)
ANION GAP SERPL CALCULATED.3IONS-SCNC: 15 MMOL/L (ref 9–17)
ANION GAP SERPL CALCULATED.3IONS-SCNC: 15 MMOL/L (ref 9–17)
AST SERPL-CCNC: 36 U/L
AST SERPL-CCNC: 36 U/L
BASOPHILS # BLD: 1 % (ref 0–2)
BASOPHILS # BLD: 1 % (ref 0–2)
BASOPHILS ABSOLUTE: 0.05 K/UL (ref 0–0.2)
BASOPHILS ABSOLUTE: 0.05 K/UL (ref 0–0.2)
BILIRUB SERPL-MCNC: 0.43 MG/DL (ref 0.3–1.2)
BILIRUB SERPL-MCNC: 0.43 MG/DL (ref 0.3–1.2)
BUN BLDV-MCNC: 10 MG/DL (ref 6–20)
BUN BLDV-MCNC: 10 MG/DL (ref 6–20)
BUN/CREAT BLD: ABNORMAL (ref 9–20)
BUN/CREAT BLD: ABNORMAL (ref 9–20)
CALCIUM SERPL-MCNC: 10 MG/DL (ref 8.6–10.4)
CALCIUM SERPL-MCNC: 10 MG/DL (ref 8.6–10.4)
CHLORIDE BLD-SCNC: 102 MMOL/L (ref 98–107)
CHLORIDE BLD-SCNC: 102 MMOL/L (ref 98–107)
CHOLESTEROL/HDL RATIO: 6.8
CHOLESTEROL/HDL RATIO: 6.8
CHOLESTEROL: 348 MG/DL
CHOLESTEROL: 348 MG/DL
CO2: 23 MMOL/L (ref 20–31)
CO2: 23 MMOL/L (ref 20–31)
CREAT SERPL-MCNC: 0.78 MG/DL (ref 0.7–1.2)
CREAT SERPL-MCNC: 0.78 MG/DL (ref 0.7–1.2)
DIFFERENTIAL TYPE: ABNORMAL
DIFFERENTIAL TYPE: ABNORMAL
EOSINOPHILS RELATIVE PERCENT: 3 % (ref 1–4)
EOSINOPHILS RELATIVE PERCENT: 3 % (ref 1–4)
GFR AFRICAN AMERICAN: >60 ML/MIN
GFR AFRICAN AMERICAN: >60 ML/MIN
GFR NON-AFRICAN AMERICAN: >60 ML/MIN
GFR NON-AFRICAN AMERICAN: >60 ML/MIN
GFR SERPL CREATININE-BSD FRML MDRD: ABNORMAL ML/MIN/{1.73_M2}
GLUCOSE BLD-MCNC: 104 MG/DL (ref 70–99)
GLUCOSE BLD-MCNC: 104 MG/DL (ref 70–99)
HAV IGM SER IA-ACNC: NONREACTIVE
HCT VFR BLD CALC: 48.7 % (ref 40.7–50.3)
HCT VFR BLD CALC: 48.7 % (ref 40.7–50.3)
HDLC SERPL-MCNC: 51 MG/DL
HDLC SERPL-MCNC: 51 MG/DL
HEMOGLOBIN: 16.2 G/DL (ref 13–17)
HEMOGLOBIN: 16.2 G/DL (ref 13–17)
HEPATITIS B CORE IGM ANTIBODY: NONREACTIVE
HEPATITIS B SURFACE ANTIGEN: NONREACTIVE
HEPATITIS C ANTIBODY: NONREACTIVE
HIV AG/AB: NONREACTIVE
IMMATURE GRANULOCYTES: 0 %
IMMATURE GRANULOCYTES: 0 %
LDL CHOLESTEROL DIRECT: 191 MG/DL
LDL CHOLESTEROL: ABNORMAL MG/DL (ref 0–130)
LDL CHOLESTEROL: ABNORMAL MG/DL (ref 0–130)
LYMPHOCYTES # BLD: 26 % (ref 24–43)
LYMPHOCYTES # BLD: 26 % (ref 24–43)
MCH RBC QN AUTO: 31.2 PG (ref 25.2–33.5)
MCH RBC QN AUTO: 31.2 PG (ref 25.2–33.5)
MCHC RBC AUTO-ENTMCNC: 33.3 G/DL (ref 28.4–34.8)
MCHC RBC AUTO-ENTMCNC: 33.3 G/DL (ref 28.4–34.8)
MCV RBC AUTO: 93.7 FL (ref 82.6–102.9)
MCV RBC AUTO: 93.7 FL (ref 82.6–102.9)
MONOCYTES # BLD: 9 % (ref 3–12)
MONOCYTES # BLD: 9 % (ref 3–12)
NRBC AUTOMATED: 0 PER 100 WBC
NRBC AUTOMATED: 0 PER 100 WBC
PDW BLD-RTO: 12.5 % (ref 11.8–14.4)
PDW BLD-RTO: 12.5 % (ref 11.8–14.4)
PLATELET # BLD: 221 K/UL (ref 138–453)
PLATELET # BLD: 221 K/UL (ref 138–453)
PLATELET ESTIMATE: ABNORMAL
PLATELET ESTIMATE: ABNORMAL
PMV BLD AUTO: 10.6 FL (ref 8.1–13.5)
PMV BLD AUTO: 10.6 FL (ref 8.1–13.5)
POTASSIUM SERPL-SCNC: 4.2 MMOL/L (ref 3.7–5.3)
POTASSIUM SERPL-SCNC: 4.2 MMOL/L (ref 3.7–5.3)
PROSTATE SPECIFIC ANTIGEN: 1.11 UG/L
RBC # BLD: 5.2 M/UL (ref 4.21–5.77)
RBC # BLD: 5.2 M/UL (ref 4.21–5.77)
RBC # BLD: ABNORMAL 10*6/UL
RBC # BLD: ABNORMAL 10*6/UL
SEG NEUTROPHILS: 61 % (ref 36–65)
SEG NEUTROPHILS: 62 % (ref 36–65)
SEGMENTED NEUTROPHILS ABSOLUTE COUNT: 2.38 K/UL (ref 1.5–8.1)
SEGMENTED NEUTROPHILS ABSOLUTE COUNT: 2.38 K/UL (ref 1.5–8.1)
SODIUM BLD-SCNC: 140 MMOL/L (ref 135–144)
SODIUM BLD-SCNC: 140 MMOL/L (ref 135–144)
TOTAL PROTEIN: 7.6 G/DL (ref 6.4–8.3)
TOTAL PROTEIN: 7.6 G/DL (ref 6.4–8.3)
TRIGL SERPL-MCNC: 443 MG/DL
TRIGL SERPL-MCNC: 443 MG/DL
VLDLC SERPL CALC-MCNC: ABNORMAL MG/DL (ref 1–30)
VLDLC SERPL CALC-MCNC: ABNORMAL MG/DL (ref 1–30)
WBC # BLD: 3.9 K/UL (ref 3.5–11.3)
WBC # BLD: 3.9 K/UL (ref 3.5–11.3)
WBC # BLD: ABNORMAL 10*3/UL
WBC # BLD: ABNORMAL 10*3/UL

## 2022-01-31 PROCEDURE — 80053 COMPREHEN METABOLIC PANEL: CPT

## 2022-01-31 PROCEDURE — 36415 COLL VENOUS BLD VENIPUNCTURE: CPT

## 2022-01-31 PROCEDURE — 83721 ASSAY OF BLOOD LIPOPROTEIN: CPT

## 2022-01-31 PROCEDURE — 80074 ACUTE HEPATITIS PANEL: CPT

## 2022-01-31 PROCEDURE — 80061 LIPID PANEL: CPT

## 2022-01-31 PROCEDURE — 87389 HIV-1 AG W/HIV-1&-2 AB AG IA: CPT

## 2022-01-31 PROCEDURE — 85025 COMPLETE CBC W/AUTO DIFF WBC: CPT

## 2022-01-31 PROCEDURE — 86480 TB TEST CELL IMMUN MEASURE: CPT

## 2022-01-31 PROCEDURE — G0103 PSA SCREENING: HCPCS

## 2022-01-31 RX ORDER — ATORVASTATIN CALCIUM 40 MG/1
40 TABLET, FILM COATED ORAL DAILY
Qty: 30 TABLET | Refills: 5 | Status: SHIPPED | OUTPATIENT
Start: 2022-01-31

## 2022-02-01 LAB — LDL CHOLESTEROL DIRECT: 191 MG/DL

## 2022-02-01 NOTE — RESULT ENCOUNTER NOTE
Please inform patient - we are still waiting on a few labs but I wanted to let him know: his cholesterol and triglycerides are very high. Were the labs done fasting? We are going to repeat these after he's been on med for a month, but I would like him to connect with his PCP about treating high cholesterol.

## 2022-02-02 LAB
QUANTI TB GOLD PLUS: NEGATIVE
QUANTI TB1 MINUS NIL: 0 IU/ML (ref 0–0.34)
QUANTI TB2 MINUS NIL: 0 IU/ML (ref 0–0.34)
QUANTIFERON MITOGEN: 7.58 IU/ML
QUANTIFERON NIL: 0.02 IU/ML

## 2022-02-03 NOTE — RESULT ENCOUNTER NOTE
Spoke to patient. After doing some additional reading about Rinvoq, I do not think it's smart to initiate it with triglycerides that high. Rinvoq can transiently elevate triglycerides, and further elevation of triglycerides could put him at risk for acute pancreatitis. We will check lipids again in 1 month after being on lipitor. Patient states he will also eat right, exercise, and ask his doctor about adding fish oil supplements for the triglycerides.

## 2022-02-18 ENCOUNTER — OFFICE VISIT (OUTPATIENT)
Dept: FAMILY MEDICINE CLINIC | Age: 52
End: 2022-02-18
Payer: COMMERCIAL

## 2022-02-18 VITALS
OXYGEN SATURATION: 94 % | SYSTOLIC BLOOD PRESSURE: 118 MMHG | TEMPERATURE: 97.4 F | WEIGHT: 180 LBS | BODY MASS INDEX: 30.9 KG/M2 | HEART RATE: 97 BPM | DIASTOLIC BLOOD PRESSURE: 80 MMHG

## 2022-02-18 DIAGNOSIS — H15.001 SCLERITIS OF RIGHT EYE: ICD-10-CM

## 2022-02-18 DIAGNOSIS — E78.2 MIXED HYPERLIPIDEMIA: ICD-10-CM

## 2022-02-18 DIAGNOSIS — N52.9 ERECTILE DYSFUNCTION, UNSPECIFIED ERECTILE DYSFUNCTION TYPE: ICD-10-CM

## 2022-02-18 DIAGNOSIS — J45.20 MILD INTERMITTENT ASTHMA WITHOUT COMPLICATION: ICD-10-CM

## 2022-02-18 DIAGNOSIS — L30.9 CHRONIC ECZEMA: Primary | ICD-10-CM

## 2022-02-18 PROCEDURE — 99214 OFFICE O/P EST MOD 30 MIN: CPT | Performed by: FAMILY MEDICINE

## 2022-02-18 RX ORDER — TADALAFIL 20 MG/1
20 TABLET ORAL PRN
Qty: 30 TABLET | Refills: 3 | Status: SHIPPED | OUTPATIENT
Start: 2022-02-18

## 2022-02-18 SDOH — ECONOMIC STABILITY: FOOD INSECURITY: WITHIN THE PAST 12 MONTHS, YOU WORRIED THAT YOUR FOOD WOULD RUN OUT BEFORE YOU GOT MONEY TO BUY MORE.: NEVER TRUE

## 2022-02-18 SDOH — ECONOMIC STABILITY: FOOD INSECURITY: WITHIN THE PAST 12 MONTHS, THE FOOD YOU BOUGHT JUST DIDN'T LAST AND YOU DIDN'T HAVE MONEY TO GET MORE.: NEVER TRUE

## 2022-02-18 ASSESSMENT — SOCIAL DETERMINANTS OF HEALTH (SDOH): HOW HARD IS IT FOR YOU TO PAY FOR THE VERY BASICS LIKE FOOD, HOUSING, MEDICAL CARE, AND HEATING?: NOT HARD AT ALL

## 2022-02-18 ASSESSMENT — PATIENT HEALTH QUESTIONNAIRE - PHQ9
1. LITTLE INTEREST OR PLEASURE IN DOING THINGS: 0
SUM OF ALL RESPONSES TO PHQ9 QUESTIONS 1 & 2: 0
2. FEELING DOWN, DEPRESSED OR HOPELESS: 0
SUM OF ALL RESPONSES TO PHQ QUESTIONS 1-9: 0

## 2022-02-18 NOTE — PATIENT INSTRUCTIONS
Patient Education        Learning About Low-Fat Eating  What is low-fat eating? Most food has some fat in it. Your body needs some fat to be healthy. But some kinds of fats are healthier than others. In a low-fat eating plan, you try to choose healthier fats and eat fewer unhealthy fats. Healthy fats include olive and canola oil. Try to avoid eating too much saturated fat, such as in cheese and meats. You do not need to cut all fat from your diet. But you can make healthier choices about the types and amount of fat you eat. Even though it is a good idea to choose healthier fats, it is still important to be careful of how much fat you eat, because all fats are high in calories. What are the different types of fats? Unhealthy fat  · Saturated fat. Saturated fats are mostly in animal foods, such as meat and dairy foods. Tropical oils, such as coconut oil, palm oil, and cocoa butter, are also saturated fats. Healthy fats  · Monounsaturated fat. Monounsaturated fats are liquid at room temperature but get solid when refrigerated. Eating foods that are high in this fat may help lower your \"bad\" (LDL) cholesterol, keep your \"good\" (HDL) cholesterol level up, and lower your chances of getting coronary artery disease. This fat is found in canola oil, olive oil, peanut oil, olives, avocados, nuts, and nut butters. · Polyunsaturated fat. Polyunsaturated fats are liquid at room temperature. They are in safflower, sunflower, and corn oils. They are also the main fat in seafood. Omega-3 fatty acids are types of polyunsaturated fat. Eating fish may lower your chances of getting coronary artery disease. Fatty fish such as salmon and mackerel contain these healthy fatty acids. So do ground flaxseeds and flaxseed oil, soybeans, walnuts, and seeds. Why cut down on unhealthy fats? Eating foods that contain saturated fats can raise the LDL (\"bad\") cholesterol in your blood.  Having a high level of LDL cholesterol increases your chance of hardening of the arteries (atherosclerosis), which can lead to heart disease, heart attack, and stroke. In general:  · No more than 10% of your daily calories should come from saturated fat. This is about 20 grams in a 2,000-calorie diet. · No more than 10% of your daily calories should come from polyunsaturated fat. This is about 20 grams in a 2,000-calorie diet. · Monounsaturated fats can be up to 15% of your daily calories. This is about 25 to 30 grams in a 2,000-calorie diet. If you're not sure how much fat you should be eating or how many calories you need each day to stay at a healthy weight, talk to a registered dietitian. A dietitian can help you create a plan that's right for you. What can you do to cut down on fat? Foods like cheese, butter, sausage, and desserts can have a lot of unhealthy fats. Try these tips for healthier meals at home and when you eat out. At home  · Fill up on fruits, vegetables, and whole grains. · Think of meat as a side dish instead of as the main part of your meal.  · When you do eat meat, make it extra-lean ground beef (97% lean), ground turkey breast (without skin added), meats with fat trimmed off before cooking, or skinless chicken. · Try main dishes that use whole wheat pasta, brown rice, dried beans, or vegetables. · Use cooking methods that use little or no fat, such as broiling, steaming, or grilling. Use cooking spray instead of oil. If you use oil, use a monounsaturated oil, such as canola or olive oil. · Read food labels on canned, bottled, or packaged foods. Choose those with little saturated fat. When eating out at a restaurant  · Order foods that are broiled or poached instead of fried or breaded. · Cut back on the amount of butter or margarine that you use on bread. Use small amounts of olive oil instead. · Order sauces, gravies, and salad dressings on the side, and use only a little.   · When you order pasta, choose tomato sauce instead of cream sauce. · Ask for salsa with your baked potato instead of sour cream, butter, cheese, or morrison. Where can you learn more? Go to https://[employer] Eng[employer]Queweyeb.[employer] Tap. org and sign in to your [employer] account. Enter Q148 in the [facility] box to learn more about \"Learning About Low-Fat Eating. \"     If you do not have an account, please click on the \"Sign Up Now\" link. Current as of: September 8, 2021               Content Version: 13.1  © 8504-5708 [employer]. Care instructions adapted under license by [facility] ([facility]). If you have questions about a medical condition or this instruction, always ask your healthcare professional. [address] any warranty or liability for your use of this information.

## 2022-02-23 ASSESSMENT — ENCOUNTER SYMPTOMS
FREQUENT THROAT CLEARING: 0
TROUBLE SWALLOWING: 0
RESPIRATORY NEGATIVE: 1
HOARSE VOICE: 0
SORE THROAT: 0
COUGH: 0
RHINORRHEA: 0
HEARTBURN: 0
CHEST TIGHTNESS: 0
EYES NEGATIVE: 1
DIFFICULTY BREATHING: 0
SHORTNESS OF BREATH: 0
WHEEZING: 0
GASTROINTESTINAL NEGATIVE: 1
SPUTUM PRODUCTION: 0
HEMOPTYSIS: 0
ALLERGIC/IMMUNOLOGIC NEGATIVE: 1

## 2022-02-23 NOTE — PROGRESS NOTES
[facility] Progress Note    Date:2/23/2022         Patient Name:Jass Fox     YOB: 1970     Age:51 y.o. Assessment/Plan        Problem List Items Addressed This Visit        Respiratory    Asthma      Well-controlled, continue current medications, continue current treatment plan, medication adherence emphasized and lifestyle modifications recommended            Musculoskeletal and Integument    Chronic eczema - Primary      Well-controlled, continue current medications, continue current treatment plan, medication adherence emphasized and lifestyle modifications recommended            Other    Erectile dysfunction      At goal, continue current medications and continue current treatment plan         Relevant Medications    tadalafil (CIALIS) 20 MG tablet    Mixed hyperlipidemia      Well-controlled, continue current medications, continue current treatment plan, medication adherence emphasized and lifestyle modifications recommended         Relevant Medications    tadalafil (CIALIS) 20 MG tablet    Scleritis of right eye      Monitored by specialist- no acute findings meriting change in the plan                Return in about 6 months (around 8/18/2022). Electronically signed by Génesis Thomason DO on 8/17/21         Dima Batista is a 54 y.o. male presenting today for   Chief Complaint   Patient presents with    6 Month Follow-Up   . Eczema  Patient complains of a rash. Onset of symptoms was several years ago, and he started on dupixent about 3 years ago and have been rapidly improving since that time. Risk factors include: has asthma. Treatment modalities that have been used in the past include: dupixent. Follows with derm. Was so bad he didn't leave the house except going to ED for pain    Asthma  There is no chest tightness, cough, difficulty breathing, frequent throat clearing, hemoptysis, hoarse voice, shortness of breath, sputum production or wheezing.  This is a chronic problem. The current episode started more than 1 year ago. The problem occurs rarely. The problem has been gradually improving. Pertinent negatives include no appetite change, chest pain, dyspnea on exertion, ear congestion, ear pain, headaches, heartburn, malaise/fatigue, myalgias, nasal congestion, orthopnea, PND, postnasal drip, rhinorrhea, sneezing, sore throat, sweats, trouble swallowing or weight loss. His symptoms are alleviated by beta-agonist. He reports significant improvement on treatment. His past medical history is significant for asthma. Hyperlipidemia  This is a chronic problem. The current episode started more than 1 year ago. The problem is uncontrolled. Recent lipid tests were reviewed and are high. Exacerbating diseases include obesity. He has no history of chronic renal disease, diabetes, hypothyroidism, liver disease or nephrotic syndrome. Pertinent negatives include no chest pain, focal sensory loss, focal weakness, leg pain, myalgias or shortness of breath. Current antihyperlipidemic treatment includes statins. Erectile Dysfunction  This is a chronic problem. The current episode started more than 1 year ago. The problem has been waxing and waning since onset. The nature of his difficulty is maintaining erection, achieving erection and penetration. He reports no anxiety, decreased libido or performance anxiety. Irritative symptoms do not include frequency, nocturia or urgency. Obstructive symptoms do not include dribbling, incomplete emptying, an intermittent stream, a slower stream, straining or a weak stream. Pertinent negatives include no chills, dysuria, genital pain, hematuria, hesitancy or inability to urinate. Past treatments include sildenafil. The treatment provided significant relief. Review of Systems   Review of Systems   Constitutional: Negative. Negative for appetite change, chills, malaise/fatigue and weight loss. HENT: Negative.   Negative for ear pain, hoarse voice, postnasal drip, rhinorrhea, sneezing, sore throat and trouble swallowing. Eyes: Negative. Respiratory: Negative. Negative for cough, hemoptysis, sputum production, shortness of breath and wheezing. Cardiovascular: Negative. Negative for chest pain, dyspnea on exertion and PND. Gastrointestinal: Negative. Negative for heartburn. Endocrine: Negative. Genitourinary: Negative. Negative for decreased libido, dysuria, frequency, hematuria, hesitancy, incomplete emptying, nocturia and urgency. Musculoskeletal: Negative. Negative for myalgias. Skin: Negative. Allergic/Immunologic: Negative. Neurological: Negative. Negative for focal weakness and headaches. Hematological: Negative. Psychiatric/Behavioral: Negative. The patient is not nervous/anxious. All other systems reviewed and are negative. Medications     Current Outpatient Medications   Medication Sig Dispense Refill    tadalafil (CIALIS) 20 MG tablet Take 1 tablet by mouth as needed for Erectile Dysfunction 30 tablet 3    atorvastatin (LIPITOR) 40 MG tablet Take 1 tablet by mouth daily 30 tablet 5    triamcinolone (KENALOG) 0.1 % ointment Apply to rash twice daily (not face, armpit or groin) 454 g 1    albuterol sulfate HFA (PROAIR HFA) 108 (90 Base) MCG/ACT inhaler INHALE TWO PUFFS BY MOUTH FOUR TIMES A DAY AS NEEDED 1 Inhaler 6    clobetasol (TEMOVATE) 0.05 % ointment Apply to eczema twice daily (not face, armpit or groin) 60 g 5    tacrolimus (PROTOPIC) 0.1 % ointment Apply to eczema on face twice daily 30 g 5     No current facility-administered medications for this visit. Past History    Past Medical History:   has a past medical history of Asthma and Eczema. Social History:   reports that he quit smoking about 4 years ago. His smoking use included cigarettes and cigars. He has a 15.00 pack-year smoking history.  He has never used smokeless tobacco. He reports current alcohol use of about 4.2 standard drinks of alcohol per week. He reports that he does not use drugs. Family History:   Family History   Problem Relation Age of Onset    Kidney Disease Mother     Hypertension Mother        Surgical History: No past surgical history on file. Physical Examination      Vitals:  /80   Pulse 97   Temp 97.4 °F (36.3 °C) (Temporal)   Wt 180 lb (81.6 kg)   SpO2 94%   BMI 30.90 kg/m²     Physical Exam  Vitals and nursing note reviewed. Constitutional:       General: He is not in acute distress. Appearance: Normal appearance. He is obese. He is not ill-appearing, toxic-appearing or diaphoretic. HENT:      Head: Normocephalic and atraumatic. Right Ear: External ear normal.      Left Ear: External ear normal.   Eyes:      General: No scleral icterus. Right eye: No discharge. Left eye: No discharge. Conjunctiva/sclera: Conjunctivae normal.   Cardiovascular:      Rate and Rhythm: Normal rate and regular rhythm. Pulses: Normal pulses. Heart sounds: Normal heart sounds. No murmur heard. No friction rub. No gallop. Pulmonary:      Effort: Pulmonary effort is normal. No respiratory distress. Breath sounds: Normal breath sounds. No stridor. No wheezing, rhonchi or rales. Chest:      Chest wall: No tenderness. Musculoskeletal:      Left shoulder: Tenderness present. No swelling, deformity, effusion, laceration, bony tenderness or crepitus. Decreased range of motion. Normal strength. Normal pulse. Skin:     General: Skin is warm. Coloration: Skin is not jaundiced or pale. Neurological:      Mental Status: He is alert and oriented to person, place, and time. Mental status is at baseline. Psychiatric:         Mood and Affect: Mood normal.         Behavior: Behavior normal.         Thought Content:  Thought content normal.         Judgment: Judgment normal.         Labs/Imaging/Diagnostics   Labs:  No results found for: CMPWITHGFR, CBCAUTODIF, TSHFT4, LABA1C, LIPIDPAN    Imaging Last 24 Hours:  XR CHEST (2 VW)  Narrative: EXAMINATION:  TWO XRAY VIEWS OF THE CHEST    4/14/2021 4:12 pm    COMPARISON:  None. HISTORY:  ORDERING SYSTEM PROVIDED HISTORY: Left-sided chest wall pain  TECHNOLOGIST PROVIDED HISTORY:  See ICDM-10 code attached  Reason for Exam: Pt states left sided chest wall pain x 1-2 weeks. no injury  Acuity: Acute  Type of Exam: Initial    FINDINGS:  Lung volumes are low. Heart size is normal.  Left basilar streaky opacity is  noted favoring atelectasis as left hemidiaphragm is slightly elevated. Focal  airspace disease not excluded. No gross effusion or extrapleural air is  noted. Impression: Low lung volumes with elevated left hemidiaphragm in streaky opacity at the  left base favoring atelectasis over acute airspace disease.

## 2022-03-16 ENCOUNTER — HOSPITAL ENCOUNTER (OUTPATIENT)
Age: 52
Setting detail: SPECIMEN
Discharge: HOME | End: 2022-03-16
Payer: COMMERCIAL

## 2022-03-16 DIAGNOSIS — E78.2 MIXED HYPERLIPIDEMIA: ICD-10-CM

## 2022-03-16 DIAGNOSIS — Z79.899 HIGH RISK MEDICATION USE: ICD-10-CM

## 2022-03-16 LAB
ALBUMIN SERPL-MCNC: 4.6 G/DL (ref 3.5–5.2)
ALBUMIN/GLOBULIN RATIO: 1.6 (ref 1–2.5)
ALP BLD-CCNC: 87 U/L (ref 40–129)
ALT SERPL-CCNC: 32 U/L (ref 5–41)
ANION GAP SERPL CALCULATED.3IONS-SCNC: 13 MMOL/L (ref 9–17)
AST SERPL-CCNC: 31 U/L
BILIRUB SERPL-MCNC: 0.71 MG/DL (ref 0.3–1.2)
BUN BLDV-MCNC: 12 MG/DL (ref 6–20)
CALCIUM SERPL-MCNC: 9.6 MG/DL (ref 8.6–10.4)
CHLORIDE BLD-SCNC: 104 MMOL/L (ref 98–107)
CHOLESTEROL/HDL RATIO: 3.3
CHOLESTEROL/HDL RATIO: 3.3
CHOLESTEROL: 166 MG/DL
CHOLESTEROL: 166 MG/DL
CO2: 26 MMOL/L (ref 20–31)
CREAT SERPL-MCNC: 0.81 MG/DL (ref 0.7–1.2)
GFR AFRICAN AMERICAN: >60 ML/MIN
GFR NON-AFRICAN AMERICAN: >60 ML/MIN
GFR SERPL CREATININE-BSD FRML MDRD: NORMAL ML/MIN/{1.73_M2}
GLUCOSE BLD-MCNC: 94 MG/DL (ref 70–99)
HDLC SERPL-MCNC: 51 MG/DL
HDLC SERPL-MCNC: 51 MG/DL
LDL CHOLESTEROL: 57 MG/DL (ref 0–130)
LDL CHOLESTEROL: 57 MG/DL (ref 0–130)
POTASSIUM SERPL-SCNC: 4.3 MMOL/L (ref 3.7–5.3)
SODIUM BLD-SCNC: 143 MMOL/L (ref 135–144)
TOTAL PROTEIN: 7.4 G/DL (ref 6.4–8.3)
TRIGL SERPL-MCNC: 290 MG/DL
TRIGL SERPL-MCNC: 290 MG/DL

## 2022-03-16 PROCEDURE — 80053 COMPREHEN METABOLIC PANEL: CPT

## 2022-03-16 PROCEDURE — 80061 LIPID PANEL: CPT

## 2022-03-16 PROCEDURE — 36415 COLL VENOUS BLD VENIPUNCTURE: CPT

## 2022-03-16 NOTE — RESULT ENCOUNTER NOTE
Please inform patient that triglycerides are still high but improved. Please initiate PA for Rinvoq and we will check his labs again at f/u appt.

## 2022-03-21 ENCOUNTER — TELEPHONE (OUTPATIENT)
Dept: DERMATOLOGY | Age: 52
End: 2022-03-21

## 2022-03-21 DIAGNOSIS — Z79.899 HIGH RISK MEDICATION USE: ICD-10-CM

## 2022-03-21 DIAGNOSIS — L20.84 INTRINSIC ATOPIC DERMATITIS: Primary | ICD-10-CM

## 2022-03-21 RX ORDER — VALACYCLOVIR HYDROCHLORIDE 500 MG/1
500 TABLET, FILM COATED ORAL DAILY
Qty: 30 TABLET | Refills: 5 | Status: SHIPPED | OUTPATIENT
Start: 2022-03-21 | End: 2022-04-20

## 2022-03-21 RX ORDER — UPADACITINIB 15 MG/1
TABLET, EXTENDED RELEASE ORAL
Qty: 30 TABLET | Refills: 0 | Status: SHIPPED | OUTPATIENT
Start: 2022-03-21 | End: 2022-04-14

## 2022-03-21 NOTE — TELEPHONE ENCOUNTER
Rinvoq approved.  Will start rinvoq AND valtrex, a medication to prevent shingles (a common adverse effect of rinvoq)    Will check labs at f/u    [facility]altrex to [facility]  Rinvoq to [facility]

## 2022-04-13 DIAGNOSIS — L20.84 INTRINSIC ATOPIC DERMATITIS: ICD-10-CM

## 2022-04-14 RX ORDER — UPADACITINIB 15 MG/1
TABLET, EXTENDED RELEASE ORAL
Qty: 30 TABLET | Refills: 0 | Status: SHIPPED | OUTPATIENT
Start: 2022-04-14 | End: 2022-06-03 | Stop reason: ALTCHOICE

## 2022-05-07 DIAGNOSIS — L20.84 INTRINSIC ATOPIC DERMATITIS: ICD-10-CM

## 2022-05-07 DIAGNOSIS — Z79.899 HIGH RISK MEDICATION USE: Primary | ICD-10-CM

## 2022-05-09 RX ORDER — UPADACITINIB 15 MG/1
TABLET, EXTENDED RELEASE ORAL
Qty: 30 TABLET | Refills: 0 | OUTPATIENT
Start: 2022-05-09

## 2022-05-11 ENCOUNTER — HOSPITAL ENCOUNTER (OUTPATIENT)
Age: 52
Setting detail: SPECIMEN
Discharge: HOME | End: 2022-05-11
Payer: COMMERCIAL

## 2022-05-11 DIAGNOSIS — Z79.899 HIGH RISK MEDICATION USE: ICD-10-CM

## 2022-05-11 LAB
ABSOLUTE EOS #: 0.04 K/UL (ref 0–0.44)
ABSOLUTE IMMATURE GRANULOCYTE: <0.03 K/UL (ref 0–0.3)
ABSOLUTE LYMPH #: 1.34 K/UL (ref 1.1–3.7)
ABSOLUTE MONO #: 0.38 K/UL (ref 0.1–1.2)
ALBUMIN SERPL-MCNC: 4.7 G/DL (ref 3.5–5.2)
ALP BLD-CCNC: 80 U/L (ref 40–129)
ALT SERPL-CCNC: 93 U/L (ref 5–41)
ANION GAP SERPL CALCULATED.3IONS-SCNC: 20 MMOL/L (ref 9–17)
AST SERPL-CCNC: 79 U/L
BASOPHILS # BLD: 1 % (ref 0–2)
BASOPHILS ABSOLUTE: 0.03 K/UL (ref 0–0.2)
BILIRUB SERPL-MCNC: 0.69 MG/DL (ref 0.3–1.2)
BUN BLDV-MCNC: 12 MG/DL (ref 6–20)
CALCIUM SERPL-MCNC: 9.6 MG/DL (ref 8.6–10.4)
CHLORIDE BLD-SCNC: 102 MMOL/L (ref 98–107)
CHOLESTEROL/HDL RATIO: 33.4
CHOLESTEROL: 768 MG/DL
CO2: 19 MMOL/L (ref 20–31)
CREAT SERPL-MCNC: 0.83 MG/DL (ref 0.7–1.2)
EOSINOPHILS RELATIVE PERCENT: 1 % (ref 1–4)
GFR AFRICAN AMERICAN: >60 ML/MIN
GFR NON-AFRICAN AMERICAN: >60 ML/MIN
GFR SERPL CREATININE-BSD FRML MDRD: ABNORMAL ML/MIN/{1.73_M2}
GLUCOSE BLD-MCNC: 92 MG/DL (ref 70–99)
HCT VFR BLD CALC: 42.3 % (ref 40.7–50.3)
HDLC SERPL-MCNC: 23 MG/DL
HEMOGLOBIN: 14.9 G/DL (ref 13–17)
IMMATURE GRANULOCYTES: 1 %
LDL CHOLESTEROL DIRECT: 46 MG/DL
LDL CHOLESTEROL: ABNORMAL MG/DL (ref 0–130)
LYMPHOCYTES # BLD: 36 % (ref 24–43)
MCH RBC QN AUTO: 32.6 PG (ref 25.2–33.5)
MCHC RBC AUTO-ENTMCNC: 35.2 G/DL (ref 28.4–34.8)
MCV RBC AUTO: 92.6 FL (ref 82.6–102.9)
MONOCYTES # BLD: 10 % (ref 3–12)
NRBC AUTOMATED: 0 PER 100 WBC
PDW BLD-RTO: 13.6 % (ref 11.8–14.4)
PLATELET # BLD: 194 K/UL (ref 138–453)
PMV BLD AUTO: 11.3 FL (ref 8.1–13.5)
POTASSIUM SERPL-SCNC: 4.6 MMOL/L (ref 3.7–5.3)
RBC # BLD: 4.57 M/UL (ref 4.21–5.77)
SEG NEUTROPHILS: 51 % (ref 36–65)
SEGMENTED NEUTROPHILS ABSOLUTE COUNT: 1.94 K/UL (ref 1.5–8.1)
SODIUM BLD-SCNC: 141 MMOL/L (ref 135–144)
TOTAL PROTEIN: 3.4 G/DL (ref 6.4–8.3)
TRIGL SERPL-MCNC: 2845 MG/DL
WBC # BLD: 3.8 K/UL (ref 3.5–11.3)

## 2022-05-11 PROCEDURE — 83721 ASSAY OF BLOOD LIPOPROTEIN: CPT

## 2022-05-11 PROCEDURE — 85025 COMPLETE CBC W/AUTO DIFF WBC: CPT

## 2022-05-11 PROCEDURE — 80061 LIPID PANEL: CPT

## 2022-05-11 PROCEDURE — 36415 COLL VENOUS BLD VENIPUNCTURE: CPT

## 2022-05-11 PROCEDURE — 80053 COMPREHEN METABOLIC PANEL: CPT

## 2022-05-12 ENCOUNTER — TELEPHONE (OUTPATIENT)
Dept: FAMILY MEDICINE CLINIC | Age: 52
End: 2022-05-12

## 2022-05-12 DIAGNOSIS — E78.2 MIXED HYPERLIPIDEMIA: Primary | ICD-10-CM

## 2022-05-12 DIAGNOSIS — R79.89 ELEVATED LFTS: ICD-10-CM

## 2022-05-12 NOTE — TELEPHONE ENCOUNTER
Patient called stating that his Dermatologist called him last night with Lipid panel results. Derm told him to call PCP ASAP because all numbers have increase dramatically in 2 months. Derm stopped his Rinvoq. Lab results in chart.  Please advise

## 2022-05-12 NOTE — RESULT ENCOUNTER NOTE
Spoke to patient about very high cholesterol and triglycerides. He was fasting for his labs. He denies abdominal pain, nausea, vomiting, chest or back pain. He states he has been forgetting to take his lipitor and states he has not been sticking to his diet. I counseled that this level of hyperlipidemia and hypertriglyceridemia is concerning and significantly increases risk for CV disease and pancreatitis. He will stop the Rinvoq immediately as it is likely contributing to the abnormalities. We will have to find an alternative for atopic dermatiits. He will discuss with Becki Yee at f/u. Unfortunately developed scleritis with dupixent so that is not an option. Could consider trying Adbry with close ophtho monitoring.

## 2022-05-12 NOTE — RESULT ENCOUNTER NOTE
Please inform patient that liver enzymes are also elevated, another reason to spot Rinvoq.  F/u with PCP regarding labs and our office regarding eczema

## 2022-05-26 DIAGNOSIS — J45.909 MODERATE ASTHMA WITHOUT COMPLICATION, UNSPECIFIED WHETHER PERSISTENT: ICD-10-CM

## 2022-05-27 RX ORDER — ALBUTEROL SULFATE 90 UG/1
AEROSOL, METERED RESPIRATORY (INHALATION)
Qty: 1 EACH | Refills: 1 | Status: SHIPPED | OUTPATIENT
Start: 2022-05-27

## 2022-06-03 ENCOUNTER — OFFICE VISIT (OUTPATIENT)
Dept: DERMATOLOGY | Age: 52
End: 2022-06-03
Payer: COMMERCIAL

## 2022-06-03 VITALS
BODY MASS INDEX: 24.62 KG/M2 | DIASTOLIC BLOOD PRESSURE: 84 MMHG | WEIGHT: 172 LBS | OXYGEN SATURATION: 94 % | HEART RATE: 89 BPM | HEIGHT: 70 IN | TEMPERATURE: 97.2 F | SYSTOLIC BLOOD PRESSURE: 126 MMHG

## 2022-06-03 DIAGNOSIS — E78.1 HYPERTRIGLYCERIDEMIA: ICD-10-CM

## 2022-06-03 DIAGNOSIS — L20.84 INTRINSIC ATOPIC DERMATITIS: Primary | ICD-10-CM

## 2022-06-03 PROCEDURE — 99214 OFFICE O/P EST MOD 30 MIN: CPT | Performed by: PHYSICIAN ASSISTANT

## 2022-06-03 NOTE — PROGRESS NOTES
Dermatology Patient Note  [address]  [facility]  Dept: [phone]  Dept Fax: [phone]      VISITDATE: 6/3/2022   REFERRING PROVIDER: No ref. provider found      Hong Garcia is a 54 y.o. male  who presents today in the office for:    Follow-up (pt states hes trying to maintain his condition as best as he can since stoping rinoq and dupixent due to elevated triglicerides )      HISTORY OF PRESENT ILLNESS:  As above. Scleritis with Dupixent and very elevated triglycerides with Rinvoq. Pt is beginning to get breakthrough on chest and neck. MEDICAL PROBLEMS:  Patient Active Problem List    Diagnosis Date Noted    Scleritis of right eye 2022    Mixed hyperlipidemia 2021    Erectile dysfunction 2021    Chronic eczema 2012    Asthma 2012       CURRENT MEDICATIONS:   Current Outpatient Medications   Medication Sig Dispense Refill    albuterol sulfate HFA (PROAIR HFA) 108 (90 Base) MCG/ACT inhaler INHALE TWO PUFFS BY MOUTH FOUR TIMES A DAY AS NEEDED 1 each 1    tadalafil (CIALIS) 20 MG tablet Take 1 tablet by mouth as needed for Erectile Dysfunction 30 tablet 3    atorvastatin (LIPITOR) 40 MG tablet Take 1 tablet by mouth daily 30 tablet 5    triamcinolone (KENALOG) 0.1 % ointment Apply to rash twice daily (not face, armpit or groin) 454 g 1    clobetasol (TEMOVATE) 0.05 % ointment Apply to eczema twice daily (not face, armpit or groin) 60 g 5    tacrolimus (PROTOPIC) 0.1 % ointment Apply to eczema on face twice daily 30 g 5     No current facility-administered medications for this visit.        ALLERGIES:   No Known Allergies    SOCIAL HISTORY:  Social History     Tobacco Use    Smoking status: Former Smoker     Packs/day: 1.00     Years: 15.00     Pack years: 15.00     Types: Cigarettes, Cigars     Quit date: 10/10/2017     Years since quittin.6    Smokeless tobacco: Never Used   Substance Use Topics    Alcohol use: Yes     Alcohol/week: 4.2 standard drinks     Types: 5 Standard drinks or equivalent per week     Comment: soc. Pertinent ROS:  Review of Systems  Skin: Denies any new changing, growing or bleeding lesions or rashes except as described in the HPI   Constitutional: Denies fevers, chills, and malaise. PHYSICAL EXAM:   /84   Pulse 89   Temp 97.2 °F (36.2 °C) (Temporal)   Ht 5' 10\" (1.778 m)   Wt 172 lb (78 kg)   SpO2 94%   BMI 24.68 kg/m²     The patient is generally well appearing, well nourished, alert and conversational. Affect is normal.    Cutaneous Exam:  Physical Exam  Sun-exposed skin: head/face, neck, both arms, digits and nails were examined. Facial covering was not removed during examination. Diagnoses/exam findings/medical history pertinent to this visit are listed below:    Assessment:   Diagnosis Orders   1. Intrinsic atopic dermatitis     2. Hypertriglyceridemia  Lipid, Fasting        Plan:  1. Intrinsic atopic dermatitis  - We will attempt to start Lonnie Ruano, pending approval from ophthalmologist.  I would like him to be monitored closely by ophthomology, as he had scleritis previously with Katharine Sa in reserve  - We will recheck triglycerides     RTC 3M    Future Appointments   Date Time Provider Keron Barroso   8/19/2022  1:20 PM Richard Koyanagi, DO [facility]  [id number]         There are no Patient Instructions on file for this visit.       Electronically signed by Olena Bonilla PA-C on 6/3/22 at 3:59 PM EDT

## 2022-06-06 ENCOUNTER — TELEPHONE (OUTPATIENT)
Dept: DERMATOLOGY | Age: 52
End: 2022-06-06

## 2022-06-06 NOTE — TELEPHONE ENCOUNTER
H needs to see an ophthalmologist and be monitored closely for scleritis. This would not involve the optic nerve. He had this SE with Dupixent, and I want him cleared with an ophthalmologist prior to starting Wyvonna Fothergill, as they are very similar drugs. He will be monitored per their preference.

## 2022-06-06 NOTE — TELEPHONE ENCOUNTER
[phone] ext. [address]    Is it ok for patient to see optometrist versus opthomologist    How frequently does he need to be seen? Does he need a visual field or an OTC which takes a picture of the optic nerve?

## 2022-06-07 NOTE — TELEPHONE ENCOUNTER
No, he will need to be followed closely, as he has already had scleritis with Dupixent and we need him followed closely if we are going to initiate Adbry. I will speak with him, if needed. Please get me a number to call.

## 2022-06-07 NOTE — TELEPHONE ENCOUNTER
Spoke to Yuval rivas and informed her of the information, she states their Opthalmologist is booked out until September. If we need to do an urgent appointment then the provider would have to talk to a technician in their office to get that set up. Is September ok?

## 2022-06-22 ENCOUNTER — HOSPITAL ENCOUNTER (OUTPATIENT)
Age: 52
Discharge: HOME | End: 2022-06-22
Payer: COMMERCIAL

## 2022-06-22 DIAGNOSIS — E78.1 HYPERTRIGLYCERIDEMIA: ICD-10-CM

## 2022-06-22 DIAGNOSIS — R79.89 ELEVATED LFTS: ICD-10-CM

## 2022-06-22 DIAGNOSIS — E78.2 MIXED HYPERLIPIDEMIA: ICD-10-CM

## 2022-06-22 LAB
ALBUMIN SERPL-MCNC: 4.4 G/DL (ref 3.5–5.2)
ALBUMIN/GLOBULIN RATIO: 1.8 (ref 1–2.5)
ALP BLD-CCNC: 93 U/L (ref 40–129)
ALT SERPL-CCNC: 23 U/L (ref 5–41)
ANION GAP SERPL CALCULATED.3IONS-SCNC: 12 MMOL/L (ref 9–17)
AST SERPL-CCNC: 24 U/L
BILIRUB SERPL-MCNC: 0.33 MG/DL (ref 0.3–1.2)
BUN BLDV-MCNC: 10 MG/DL (ref 6–20)
CALCIUM SERPL-MCNC: 9.3 MG/DL (ref 8.6–10.4)
CHLORIDE BLD-SCNC: 103 MMOL/L (ref 98–107)
CHOLESTEROL, FASTING: 216 MG/DL
CHOLESTEROL/HDL RATIO: 4.8
CHOLESTEROL/HDL RATIO: 4.8
CHOLESTEROL: 216 MG/DL
CO2: 24 MMOL/L (ref 20–31)
CREAT SERPL-MCNC: 0.82 MG/DL (ref 0.7–1.2)
GFR AFRICAN AMERICAN: >60 ML/MIN
GFR NON-AFRICAN AMERICAN: >60 ML/MIN
GFR SERPL CREATININE-BSD FRML MDRD: ABNORMAL ML/MIN/{1.73_M2}
GLUCOSE BLD-MCNC: 107 MG/DL (ref 70–99)
HDLC SERPL-MCNC: 45 MG/DL
HDLC SERPL-MCNC: 45 MG/DL
LDL CHOLESTEROL: 136 MG/DL (ref 0–130)
LDL CHOLESTEROL: 136 MG/DL (ref 0–130)
POTASSIUM SERPL-SCNC: 3.6 MMOL/L (ref 3.7–5.3)
SODIUM BLD-SCNC: 139 MMOL/L (ref 135–144)
TOTAL PROTEIN: 6.9 G/DL (ref 6.4–8.3)
TRIGL SERPL-MCNC: 175 MG/DL
TRIGLYCERIDE, FASTING: 175 MG/DL

## 2022-06-22 PROCEDURE — 80053 COMPREHEN METABOLIC PANEL: CPT

## 2022-06-22 PROCEDURE — 80061 LIPID PANEL: CPT

## 2022-06-22 PROCEDURE — 36415 COLL VENOUS BLD VENIPUNCTURE: CPT

## 2022-06-27 ENCOUNTER — APPOINTMENT (OUTPATIENT)
Dept: CT IMAGING | Age: 52
End: 2022-06-27
Payer: COMMERCIAL

## 2022-06-27 ENCOUNTER — TELEPHONE (OUTPATIENT)
Dept: FAMILY MEDICINE CLINIC | Age: 52
End: 2022-06-27

## 2022-06-27 ENCOUNTER — HOSPITAL ENCOUNTER (EMERGENCY)
Age: 52
Discharge: LEFT BEFORE DISCHARGE | End: 2022-06-27
Attending: EMERGENCY MEDICINE
Payer: COMMERCIAL

## 2022-06-27 ENCOUNTER — HOSPITAL ENCOUNTER (INPATIENT)
Age: 52
LOS: 1 days | Discharge: HOME | DRG: 074 | End: 2022-06-28
Attending: EMERGENCY MEDICINE | Admitting: HOSPITALIST
Payer: COMMERCIAL

## 2022-06-27 VITALS
HEIGHT: 70 IN | OXYGEN SATURATION: 97 % | WEIGHT: 175 LBS | HEART RATE: 92 BPM | BODY MASS INDEX: 25.05 KG/M2 | SYSTOLIC BLOOD PRESSURE: 148 MMHG | RESPIRATION RATE: 21 BRPM | TEMPERATURE: 97.9 F | DIASTOLIC BLOOD PRESSURE: 87 MMHG

## 2022-06-27 DIAGNOSIS — R29.90 STROKE-LIKE SYMPTOMS: Primary | ICD-10-CM

## 2022-06-27 PROBLEM — R29.810 FACIAL DROOP: Status: ACTIVE | Noted: 2022-06-27

## 2022-06-27 PROBLEM — R91.1 LEFT UPPER LOBE PULMONARY NODULE: Status: ACTIVE | Noted: 2022-06-27

## 2022-06-27 LAB
ABSOLUTE EOS #: 0.12 K/UL (ref 0–0.44)
ABSOLUTE IMMATURE GRANULOCYTE: 0.03 K/UL (ref 0–0.3)
ABSOLUTE LYMPH #: 1.05 K/UL (ref 1.1–3.7)
ABSOLUTE MONO #: 0.4 K/UL (ref 0.1–1.2)
ANION GAP SERPL CALCULATED.3IONS-SCNC: 12 MMOL/L (ref 9–17)
BASOPHILS # BLD: 1 % (ref 0–2)
BASOPHILS ABSOLUTE: 0.05 K/UL (ref 0–0.2)
BUN BLDV-MCNC: 16 MG/DL (ref 6–20)
BUN/CREAT BLD: 14 (ref 9–20)
CALCIUM SERPL-MCNC: 9.2 MG/DL (ref 8.6–10.4)
CHLORIDE BLD-SCNC: 99 MMOL/L (ref 98–107)
CO2: 26 MMOL/L (ref 20–31)
CREAT SERPL-MCNC: 1.12 MG/DL (ref 0.7–1.2)
EOSINOPHILS RELATIVE PERCENT: 3 % (ref 1–4)
GFR AFRICAN AMERICAN: >60 ML/MIN
GFR NON-AFRICAN AMERICAN: >60 ML/MIN
GFR SERPL CREATININE-BSD FRML MDRD: ABNORMAL ML/MIN/{1.73_M2}
GLUCOSE BLD-MCNC: 129 MG/DL (ref 70–99)
GLUCOSE BLD-MCNC: 177 MG/DL (ref 75–110)
HCT VFR BLD CALC: 44.1 % (ref 40.7–50.3)
HEMOGLOBIN: 14.2 G/DL (ref 13–17)
IMMATURE GRANULOCYTES: 1 %
INR BLD: 1
LYMPHOCYTES # BLD: 26 % (ref 24–43)
MCH RBC QN AUTO: 31.3 PG (ref 25.2–33.5)
MCHC RBC AUTO-ENTMCNC: 32.2 G/DL (ref 28.4–34.8)
MCV RBC AUTO: 97.4 FL (ref 82.6–102.9)
MONOCYTES # BLD: 10 % (ref 3–12)
NRBC AUTOMATED: 0 PER 100 WBC
PARTIAL THROMBOPLASTIN TIME: 32.9 SEC (ref 23.9–33.8)
PDW BLD-RTO: 14 % (ref 11.8–14.4)
PLATELET # BLD: 251 K/UL (ref 138–453)
PMV BLD AUTO: 9.8 FL (ref 8.1–13.5)
POTASSIUM SERPL-SCNC: 4.2 MMOL/L (ref 3.7–5.3)
PROTHROMBIN TIME: 13.3 SEC (ref 11.5–14.2)
RBC # BLD: 4.53 M/UL (ref 4.21–5.77)
SEG NEUTROPHILS: 59 % (ref 36–65)
SEGMENTED NEUTROPHILS ABSOLUTE COUNT: 2.47 K/UL (ref 1.5–8.1)
SODIUM BLD-SCNC: 137 MMOL/L (ref 135–144)
TROPONIN, HIGH SENSITIVITY: 8 NG/L (ref 0–22)
WBC # BLD: 4.1 K/UL (ref 3.5–11.3)

## 2022-06-27 PROCEDURE — 82947 ASSAY GLUCOSE BLOOD QUANT: CPT

## 2022-06-27 PROCEDURE — 99222 1ST HOSP IP/OBS MODERATE 55: CPT | Performed by: INTERNAL MEDICINE

## 2022-06-27 PROCEDURE — 70498 CT ANGIOGRAPHY NECK: CPT

## 2022-06-27 PROCEDURE — 99449 NTRPROF PH1/NTRNET/EHR 31/>: CPT | Performed by: PSYCHIATRY & NEUROLOGY

## 2022-06-27 PROCEDURE — 93005 ELECTROCARDIOGRAM TRACING: CPT | Performed by: EMERGENCY MEDICINE

## 2022-06-27 PROCEDURE — 80048 BASIC METABOLIC PNL TOTAL CA: CPT

## 2022-06-27 PROCEDURE — 2060000000 HC ICU INTERMEDIATE R&B

## 2022-06-27 PROCEDURE — 6360000004 HC RX CONTRAST MEDICATION: Performed by: EMERGENCY MEDICINE

## 2022-06-27 PROCEDURE — 85730 THROMBOPLASTIN TIME PARTIAL: CPT

## 2022-06-27 PROCEDURE — 84484 ASSAY OF TROPONIN QUANT: CPT

## 2022-06-27 PROCEDURE — 99285 EMERGENCY DEPT VISIT HI MDM: CPT

## 2022-06-27 PROCEDURE — 85610 PROTHROMBIN TIME: CPT

## 2022-06-27 PROCEDURE — 2580000003 HC RX 258: Performed by: EMERGENCY MEDICINE

## 2022-06-27 PROCEDURE — 85025 COMPLETE CBC W/AUTO DIFF WBC: CPT

## 2022-06-27 PROCEDURE — 2580000003 HC RX 258: Performed by: NURSE PRACTITIONER

## 2022-06-27 PROCEDURE — 6370000000 HC RX 637 (ALT 250 FOR IP): Performed by: EMERGENCY MEDICINE

## 2022-06-27 PROCEDURE — 70450 CT HEAD/BRAIN W/O DYE: CPT

## 2022-06-27 RX ORDER — ONDANSETRON 2 MG/ML
4 INJECTION INTRAMUSCULAR; INTRAVENOUS EVERY 6 HOURS PRN
Status: DISCONTINUED | OUTPATIENT
Start: 2022-06-27 | End: 2022-06-29 | Stop reason: HOSPADM

## 2022-06-27 RX ORDER — SODIUM CHLORIDE 9 MG/ML
INJECTION, SOLUTION INTRAVENOUS PRN
Status: DISCONTINUED | OUTPATIENT
Start: 2022-06-27 | End: 2022-06-29 | Stop reason: HOSPADM

## 2022-06-27 RX ORDER — 0.9 % SODIUM CHLORIDE 0.9 %
80 INTRAVENOUS SOLUTION INTRAVENOUS ONCE
Status: COMPLETED | OUTPATIENT
Start: 2022-06-27 | End: 2022-06-27

## 2022-06-27 RX ORDER — SODIUM CHLORIDE 0.9 % (FLUSH) 0.9 %
10 SYRINGE (ML) INJECTION PRN
Status: DISCONTINUED | OUTPATIENT
Start: 2022-06-27 | End: 2022-06-27 | Stop reason: HOSPADM

## 2022-06-27 RX ORDER — ASPIRIN 81 MG/1
324 TABLET, CHEWABLE ORAL ONCE
Status: COMPLETED | OUTPATIENT
Start: 2022-06-27 | End: 2022-06-27

## 2022-06-27 RX ORDER — ALBUTEROL SULFATE 90 UG/1
2 AEROSOL, METERED RESPIRATORY (INHALATION) EVERY 6 HOURS PRN
Status: DISCONTINUED | OUTPATIENT
Start: 2022-06-27 | End: 2022-06-29 | Stop reason: HOSPADM

## 2022-06-27 RX ORDER — ONDANSETRON 4 MG/1
4 TABLET, ORALLY DISINTEGRATING ORAL EVERY 8 HOURS PRN
Status: DISCONTINUED | OUTPATIENT
Start: 2022-06-27 | End: 2022-06-29 | Stop reason: HOSPADM

## 2022-06-27 RX ORDER — CLOBETASOL PROPIONATE 0.5 MG/G
OINTMENT TOPICAL 2 TIMES DAILY
Status: DISCONTINUED | OUTPATIENT
Start: 2022-06-27 | End: 2022-06-28

## 2022-06-27 RX ORDER — CLOPIDOGREL BISULFATE 75 MG/1
300 TABLET ORAL ONCE
Status: COMPLETED | OUTPATIENT
Start: 2022-06-27 | End: 2022-06-27

## 2022-06-27 RX ORDER — CLOPIDOGREL BISULFATE 75 MG/1
75 TABLET ORAL DAILY
Status: DISCONTINUED | OUTPATIENT
Start: 2022-06-28 | End: 2022-06-29 | Stop reason: HOSPADM

## 2022-06-27 RX ORDER — ATORVASTATIN CALCIUM 40 MG/1
40 TABLET, FILM COATED ORAL NIGHTLY
Status: DISCONTINUED | OUTPATIENT
Start: 2022-06-27 | End: 2022-06-29 | Stop reason: HOSPADM

## 2022-06-27 RX ORDER — TACROLIMUS 1 MG/G
OINTMENT TOPICAL 2 TIMES DAILY
Status: DISCONTINUED | OUTPATIENT
Start: 2022-06-27 | End: 2022-06-28

## 2022-06-27 RX ORDER — ENOXAPARIN SODIUM 100 MG/ML
40 INJECTION SUBCUTANEOUS DAILY
Status: DISCONTINUED | OUTPATIENT
Start: 2022-06-27 | End: 2022-06-29 | Stop reason: HOSPADM

## 2022-06-27 RX ORDER — SODIUM CHLORIDE 0.9 % (FLUSH) 0.9 %
10 SYRINGE (ML) INJECTION EVERY 12 HOURS SCHEDULED
Status: DISCONTINUED | OUTPATIENT
Start: 2022-06-27 | End: 2022-06-29 | Stop reason: HOSPADM

## 2022-06-27 RX ORDER — SODIUM CHLORIDE 0.9 % (FLUSH) 0.9 %
10 SYRINGE (ML) INJECTION PRN
Status: DISCONTINUED | OUTPATIENT
Start: 2022-06-27 | End: 2022-06-29 | Stop reason: HOSPADM

## 2022-06-27 RX ORDER — ASPIRIN 81 MG/1
81 TABLET ORAL DAILY
Status: DISCONTINUED | OUTPATIENT
Start: 2022-06-28 | End: 2022-06-29 | Stop reason: HOSPADM

## 2022-06-27 RX ADMIN — CLOPIDOGREL BISULFATE 300 MG: 75 TABLET ORAL at 12:33

## 2022-06-27 RX ADMIN — SODIUM CHLORIDE 80 ML: 9 INJECTION, SOLUTION INTRAVENOUS at 11:51

## 2022-06-27 RX ADMIN — IOPAMIDOL 75 ML: 755 INJECTION, SOLUTION INTRAVENOUS at 11:51

## 2022-06-27 RX ADMIN — ASPIRIN 81 MG CHEWABLE TABLET 324 MG: 81 TABLET CHEWABLE at 12:31

## 2022-06-27 RX ADMIN — SODIUM CHLORIDE, PRESERVATIVE FREE 10 ML: 5 INJECTION INTRAVENOUS at 11:51

## 2022-06-27 RX ADMIN — SODIUM CHLORIDE, PRESERVATIVE FREE 10 ML: 5 INJECTION INTRAVENOUS at 20:33

## 2022-06-27 ASSESSMENT — ENCOUNTER SYMPTOMS
DIARRHEA: 0
APNEA: 0
ABDOMINAL DISTENTION: 0
SHORTNESS OF BREATH: 0
EYES NEGATIVE: 1
COLOR CHANGE: 0
SINUS PAIN: 0
CONSTIPATION: 0
CHEST TIGHTNESS: 0
VOMITING: 0

## 2022-06-27 ASSESSMENT — PAIN - FUNCTIONAL ASSESSMENT: PAIN_FUNCTIONAL_ASSESSMENT: NONE - DENIES PAIN

## 2022-06-27 NOTE — ED TRIAGE NOTES
Pt arrives to ED via private auto accompanied by wife for c/o slurred speech and L sided facial numbness/facial droop. Pt reports LKW yesterday, 6/26/22 @ 2200 when he went to bed feeling normal. Pt reports when he woke up and was brushing his teeth he noticed he was drooling from the left side and had a hard time spitting. Pt arrives alert and oriented X4.  in triage. Pt denies hx of CVA. Pt denies HX of HTN. Pt does endorse hyperlipidemia. Pt denies headache, visual change or dizziness. Pt denies numbness at time of triage, but does have a noticeable L facial droop. See Stroke Narrator for further documentation.

## 2022-06-27 NOTE — ED NOTES
Pt agrees to plan of care and states \"I may come back. I just have things at home to take care of\". Pt verbalized understanding of plan of care and symptoms of stroke and when to return to the ED. Pt ambulatory and alert.       Nini Rodriguez RN  06/27/22 [id number]

## 2022-06-27 NOTE — ED NOTES
ED to inpatient nurses report     Chief Complaint   Patient presents with    Aphasia     Pt seen earlier and left; back for admission      Present to ED from Home for CC of aphasia and left sided facial droop   LOC: alert and orientated to name, place, date  Vital signs   Vitals:    06/27/22 1618   BP: (!) 137/92   Pulse: 92   Resp: 16   Temp: 98.8 °F (37.1 °C)   TempSrc: Oral   SpO2: 98%      Oxygen Baseline: RA    Current needs required None   LDAs:   Peripheral IV 06/27/22 Left Antecubital (Active)   Site Assessment Clean, dry & intact 06/27/22 1653   Line Status Blood return noted;Brisk blood return;Flushed;Normal saline locked 06/27/22 1653   Dressing Intervention New 06/27/22 1653     Mobility: Independent  Fall Risk:    Pending ED orders: None   Present condition: Stable  Code Status: Full  Consults: IP CONSULT TO INTERNAL MEDICINE  [x]  Hospitalist  Completed  [x] yes [] no Who: Intermed  []  Medicine  Completed  [] yes [] No Who:   []  Cardiology  Completed  [] yes [] No Who:   []  GI   Completed  [] yes [] No Who:   [x]  Neurology  Completed  [x] yes [] No Who: Completed during original visit this AM, Dr. Mekhi Recio  []  Nephrology Completed  [] yes [] No Who:    []  Vascular  Completed  [] yes [] No Who:   []  Ortho  Completed  [] yes [] No Who:     []  Surgery  Completed  [] yes [] No Who:    []  Urology  Completed  [] yes [] No Who:    []  CT Surgery Completed  [] yes [] No Who:   []  Podiatry  Completed  [] yes [] No Who:    []  Other    Completed  [] yes [] No Who:  Interventions: During this visit IV established, EKG. Previous morning visit CT. Important Events: LKW 2200 6/26/2022. Passed Swallow study around [phone], then attempted to take plavix and began to cough.  Pt then states he returned to the ED due to having difficulty swallowing at home       Electronically signed by Grisel Oconnor RN on 6/27/2022 at [address] RN  06/27/22 1830 [facility],[address], RN  06/27/22 2722

## 2022-06-27 NOTE — H&P
[facility]  Office: [address], DO, Andres Wigginss, DO, Lisa Balint, DO, Moi Light Blood, DO, Duong Dawkins MD, Estephanie De Leon MD, Bree Samuels MD, Ferny Zuleta MD, Jaki Wilkinson MD, Fani William MD, Sandrita Salgado MD, Myriam Nguyen, DO, Joe Foote MD,  Sanjay Santos, DO, Lesia Peralta MD, Rahul Zavala MD, Jeremy Dakins, DO, Radha Maradiaga MD, Nancy Tirado MD, Lamonte Cooks, DO, Lisa Weaver MD, Ashlee Shaffer MD, oSy Gomez [facility], [facility], CNP, Kassie Melendez, CNP, Mendez Reynolds, CNP, Horace Barraza, CNP, Alva Otero, CNP, Mirta Oliveira PA-C, Malena Bernal, [facility], Karla Cross, CNP, Janny Cardona, CNP, Kj Villalobos, CNP, Mark Ochoa, CNS, Jas Burkett, [facility], Glory Kamara, CNP, Gretchen Madison, CNP, Manju Cevallos, [facility] 97    HISTORY AND PHYSICAL EXAMINATION            Date:   6/27/2022  Patient name:  Calin Berry  Date of admission:  6/27/2022  4:35 PM  MRN:   [id number]  Account:  [de-identified]  YOB: 1970  PCP:    Hazel Kidd DO  Room:   [place] [address]  Code Status:    No Order    Chief Complaint:     Chief Complaint   Patient presents with    Aphasia     Pt seen earlier and left; back for admission       History Obtained From:     patient    History of Present Illness:     Calin Berry is a 54 y.o. Non- / non  male who presents with Aphasia (Pt seen earlier and left; back for admission)   and is admitted to the hospital for the management of Stroke-like symptoms. This is a 29-year-old male that presents with facial droop with drooling. He had no symptoms last evening when he went to bed around 10 PM, he awoke this morning and noticed his symptoms when brushing his teeth. He denies any other neurological deficit of numbness, weakness of the extremities.   He presented here for evaluation underwent routine investigation, he signed out AMA from his initial ER visit and returned hours later. Past Medical History:     Past Medical History:   Diagnosis Date    Asthma     Eczema         Past Surgical History:     History reviewed. No pertinent surgical history. Medications Prior to Admission:     Prior to Admission medications    Medication Sig Start Date End Date Taking? Authorizing Provider   albuterol sulfate HFA (PROAIR HFA) 108 (90 Base) MCG/ACT inhaler INHALE TWO PUFFS BY MOUTH FOUR TIMES A DAY AS NEEDED 5/27/22   Anabell Bergeron, DO   tadalafil (CIALIS) 20 MG tablet Take 1 tablet by mouth as needed for Erectile Dysfunction 2/18/22   Mamadou Gift, DO   atorvastatin (LIPITOR) 40 MG tablet Take 1 tablet by mouth daily 1/31/22   Veronica Long,    triamcinolone (KENALOG) 0.1 % ointment Apply to rash twice daily (not face, armpit or groin) 1/27/22   Bernadine Aburto MD   clobetasol (TEMOVATE) 0.05 % ointment Apply to eczema twice daily (not face, armpit or groin) 1/28/21   Frankie Baumgarten, MD   tacrolimus (PROTOPIC) 0.1 % ointment Apply to eczema on face twice daily 1/28/21   Frankie Baumgarten, MD        Allergies:     Patient has no known allergies. Social History:     Tobacco:    reports that he quit smoking about 4 years ago. His smoking use included cigarettes and cigars. He has a 15.00 pack-year smoking history. He has never used smokeless tobacco.  Alcohol:      reports current alcohol use of about 4.2 standard drinks of alcohol per week. Drug Use:  reports no history of drug use. Family History:     Family History   Problem Relation Age of Onset    Kidney Disease Mother     Hypertension Mother        Review of Systems:     Positive and Negative as described in HPI.     CONSTITUTIONAL:  negative for fevers, chills, sweats, fatigue, weight loss  HEENT:  negative for vision, hearing changes, runny nose, throat pain  RESPIRATORY:  negative for shortness of breath, cough, congestion, wheezing  CARDIOVASCULAR: No gross lesions, rashes, bruising or bleeding on exposed skin area  Extremities:  peripheral pulses palpable, no pedal edema or calf pain with palpation  Psych: normal affect     Investigations:      Laboratory Testing:  Recent Results (from the past 24 hour(s))   POC Glucose Fingerstick    Collection Time: 06/27/22 11:27 AM   Result Value Ref Range    POC Glucose 177 (H) 75 - 110 mg/dL   EKG 12 Lead    Collection Time: 06/27/22 11:35 AM   Result Value Ref Range    Ventricular Rate 86 BPM    Atrial Rate 86 BPM    P-R Interval 160 ms    QRS Duration 74 ms    Q-T Interval 340 ms    QTc Calculation (Bazett) 406 ms    P Axis 61 degrees    R Axis 64 degrees    T Axis 73 degrees   CBC with Auto Differential    Collection Time: 06/27/22 11:40 AM   Result Value Ref Range    WBC 4.1 3.5 - 11.3 k/uL    RBC 4.53 4.21 - 5.77 m/uL    Hemoglobin 14.2 13.0 - 17.0 g/dL    Hematocrit 44.1 40.7 - 50.3 %    MCV 97.4 82.6 - 102.9 fL    MCH 31.3 25.2 - 33.5 pg    MCHC 32.2 28.4 - 34.8 g/dL    RDW 14.0 11.8 - 14.4 %    Platelets 349 664 - 499 k/uL    MPV 9.8 8.1 - 13.5 fL    NRBC Automated 0.0 0.0 per 100 WBC    Seg Neutrophils 59 36 - 65 %    Lymphocytes 26 24 - 43 %    Monocytes 10 3 - 12 %    Eosinophils % 3 1 - 4 %    Basophils 1 0 - 2 %    Immature Granulocytes 1 (H) 0 %    Segs Absolute 2.47 1.50 - 8.10 k/uL    Absolute Lymph # 1.05 (L) 1.10 - 3.70 k/uL    Absolute Mono # 0.40 0.10 - 1.20 k/uL    Absolute Eos # 0.12 0.00 - 0.44 k/uL    Basophils Absolute 0.05 0.00 - 0.20 k/uL    Absolute Immature Granulocyte 0.03 0.00 - 0.30 k/uL   Basic Metabolic Panel w/ Reflex to MG    Collection Time: 06/27/22 11:40 AM   Result Value Ref Range    Glucose 129 (H) 70 - 99 mg/dL    BUN 16 6 - 20 mg/dL    CREATININE 1.12 0.70 - 1.20 mg/dL    Bun/Cre Ratio 14 9 - 20    Calcium 9.2 8.6 - 10.4 mg/dL    Sodium 137 135 - 144 mmol/L    Potassium 4.2 3.7 - 5.3 mmol/L    Chloride 99 98 - 107 mmol/L    CO2 26 20 - 31 mmol/L    Anion Gap 12 9 - 17 mmol/L GFR Non-African American >60 >60 mL/min    GFR African American >60 >60 mL/min    GFR Comment         Troponin    Collection Time: 06/27/22 11:40 AM   Result Value Ref Range    Troponin, High Sensitivity 8 0 - 22 ng/L   Protime-INR    Collection Time: 06/27/22 11:40 AM   Result Value Ref Range    Protime 13.3 11.5 - 14.2 sec    INR 1.0    APTT    Collection Time: 06/27/22 11:40 AM   Result Value Ref Range    PTT 32.9 23.9 - 33.8 sec       Imaging/Diagnostics:  CT Head WO Contrast    Result Date: 6/27/2022  No acute intracranial abnormality. CTA HEAD NECK W CONTRAST    Result Date: 6/27/2022  No acute abnormality or flow-limiting stenosis of the major arteries of the head and neck. 5 mm lung nodule at the left lung apex. Follow-up recommendation is listed below. RECOMMENDATIONS: Fleischner Society guidelines for follow-up and management of incidentally detected pulmonary nodules: Single Solid Nodule: Nodule size less than 6 mm In a low-risk patient, no routine follow-up. In a high-risk patient, optional CT at 12 months. - Low risk patients include individuals with minimal or absent history of smoking and other known risk factors. - High risk patients include individuals with a history or smoking or known risk factors. Radiology 2017 http://pubs. rsna.org/doi/full/10.1148/radiol. 1127663990       Assessment :      Hospital Problems           Last Modified POA    * (Principal) Stroke-like symptoms 6/27/2022 Yes    Facial droop 6/27/2022 Yes    Left upper lobe pulmonary nodule 6/27/2022 Yes    Overview Signed 6/27/2022  6:29 PM by Emory Sizer, DO     6 mm nodule noted on CTA head and neck, will need outpatient low-dose CT scan chest to further evaluate for pulmonary nodules         Mild intermittent asthma without complication (Chronic) 5/53/1965 Yes    Mixed hyperlipidemia 6/27/2022 Yes          Plan:     Patient status inpatient in the Progressive Unit/Step down    1. Observation evaluation  2.  Stroke work-up with echocardiogram and MRI  3. CTA of head and neck noted  4. Noted left upper lobe pulmonary nodule on CTA, outpatient low-dose CT scan chest to further evaluate for nodules and further recommendations for follow-up based on findings. 5. GI DVT prophylaxis  6. Neurology evaluation  7. Therapy evaluations as needed  8. Continue home albuterol  9. Statin and aspirin as ordered  10.  Discharge planning 24 hours pending MRI and echocardiogram results    Consultations:   IP CONSULT TO INTERNAL MEDICINE  IP CONSULT TO [address] [place] [address]  6/27/2022  6:29 PM    Copy sent to Dr. Richard Koyanagi, DO

## 2022-06-27 NOTE — ED PROVIDER NOTES
EMERGENCY DEPARTMENT ENCOUNTER    Pt Name: Marcelle Arriola  MRN: [id number]  [place] 1970  Date of evaluation: 6/27/22  CHIEF COMPLAINT       Chief Complaint   Patient presents with    Aphasia     Pt seen earlier and left; back for admission     HISTORY OF PRESENT ILLNESS   53-year-old male reports to the emergency room for admission for stroke symptoms. Patient was seen here earlier today for left facial droop. Recommendation was admission for MRI and neurology work-up. Patient ended up leaving [address] however he has returned for admission and is agreeable to stay at this time. He still has the facial droop and reports that when he closes his left eye his right eye closes as well and he cannot open it. REVIEW OF SYSTEMS     Review of Systems   Constitutional: Negative for activity change, chills and diaphoresis. HENT: Negative for congestion, sinus pain and tinnitus. Eyes: Negative. Respiratory: Negative for apnea, chest tightness and shortness of breath. Gastrointestinal: Negative for abdominal distention, constipation, diarrhea and vomiting. Genitourinary: Negative for difficulty urinating and frequency. Musculoskeletal: Negative for arthralgias and myalgias. Skin: Negative for color change and rash. Neurological: Negative for dizziness. Hematological: Negative. Psychiatric/Behavioral: Negative. 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 No pertinent surgical history.   CURRENT MEDICATIONS       Previous Medications    ALBUTEROL SULFATE HFA (PROAIR HFA) 108 (90 BASE) MCG/ACT INHALER    INHALE TWO PUFFS BY MOUTH FOUR TIMES A DAY AS NEEDED    ATORVASTATIN (LIPITOR) 40 MG TABLET    Take 1 tablet by mouth daily    CLOBETASOL (TEMOVATE) 0.05 % OINTMENT    Apply to eczema twice daily (not face, armpit or groin)    TACROLIMUS (PROTOPIC) 0.1 % OINTMENT    Apply to eczema on face twice daily    TADALAFIL (CIALIS) 20 MG TABLET    Take 1 tablet by mouth as needed for Erectile Dysfunction    TRIAMCINOLONE (KENALOG) 0.1 % OINTMENT    Apply to rash twice daily (not face, armpit or groin)     ALLERGIES     has No Known Allergies. FAMILY HISTORY     He indicated that his mother is alive. He indicated that his father is alive. SOCIAL HISTORY       Social History     Tobacco Use    Smoking status: Former Smoker     Packs/day: 1.00     Years: 15.00     Pack years: 15.00     Types: Cigarettes, Cigars     Quit date: 10/10/2017     Years since quittin.7    Smokeless tobacco: Never Used   Substance Use Topics    Alcohol use: Yes     Alcohol/week: 4.2 standard drinks     Types: 5 Standard drinks or equivalent per week    Drug use: No     PHYSICAL EXAM     INITIAL VITALS: BP (!) 137/92   Pulse 92   Temp 98.8 °F (37.1 °C) (Oral)   Resp 16   SpO2 98%    Physical Exam  Constitutional:       General: He is not in acute distress. Appearance: He is well-developed. HENT:      Head: Normocephalic. Eyes:      Pupils: Pupils are equal, round, and reactive to light. Cardiovascular:      Rate and Rhythm: Normal rate and regular rhythm. Heart sounds: Normal heart sounds. Pulmonary:      Effort: Pulmonary effort is normal. No respiratory distress. Breath sounds: Normal breath sounds. Abdominal:      General: Bowel sounds are normal.      Palpations: Abdomen is soft. Tenderness: There is no abdominal tenderness. Musculoskeletal:         General: Normal range of motion. Skin:     General: Skin is warm and dry. Neurological:      Mental Status: He is alert and oriented to person, place, and time.       Comments: Patient has minor facial paralysis to the left side of the face with forehead sparing  He reports inability to open the right eye when the left is closed         MEDICAL DECISION MAKING:     C12-year-old male presenting to the ED for left-sided facial droop and weakness. Patient has forehead sparing. Stroke alert was called earlier in the day and patient had ED stroke work-up here completed including CT head CTA head and neck. Patient given loading dose of aspirin and Plavix at previous encounter. Please see previous ED encounter for this information. Case discussed with [facility] who will accept. CRITICAL CARE:       PROCEDURES:    Procedures    DIAGNOSTIC RESULTS   EKG:All EKG's are interpreted by the Emergency Department Physician who either signs or Co-signs this chart in the absence of a cardiologist.    EKG sinus rhythm ventricular rate 86  No significant ST changes    QTc 435  Unremarkable EKG    RADIOLOGY:All plain film, CT, MRI, and formal ultrasound images (except ED bedside ultrasound) are read by the radiologist, see reports below, unless otherwisenoted in MDM or here. No orders to display     LABS: All lab results were reviewed by myself, and all abnormals are listed below. Labs Reviewed - No data to display    EMERGENCY DEPARTMENTCOURSE:         Vitals:    Vitals:    06/27/22 1618   BP: (!) 137/92   Pulse: 92   Resp: 16   Temp: 98.8 °F (37.1 °C)   TempSrc: Oral   SpO2: 98%       The patient was given the following medications while in the emergency department:  No orders of the defined types were placed in this encounter. CONSULTS:  IP CONSULT TO INTERNAL MEDICINE  IP CONSULT TO NEUROLOGY    FINAL IMPRESSION      1. Stroke-like symptoms          DISPOSITION/PLAN   DISPOSITION Admitted 06/27/2022 05:36:42 PM      PATIENT REFERRED TO:  No follow-up provider specified.   DISCHARGE MEDICATIONS:  New Prescriptions    No medications on file     Mil Bingham MD  Attending Emergency Physician      Care during this encounter was due to an unprecedented national emergency due to COVID-19.            Alexa Rivera MD  06/27/22 7129

## 2022-06-27 NOTE — VIRTUAL HEALTH
1 \[facility]\"" Stroke and Vascular Neurology Consult for  [facility] Stroke Alert through [address] @ 11:46  6/27/2022 2:55 PM  Pt Name: Marcelle Arriola  MRN: [id number]  [place]: 1970  Date of evaluation: 6/27/2022  Primary Care Physician: Tino Corey DO  Reason for Evaluation: Stroke Evaluation with Discussion with Ed or primary team with Telemedicine and stroke evaluation with Review of imaging and labs    Marcelle Arriola is a 54 y.o. male who presents with history of htn, hld, and lkw 22:00 with symptoms when awoke with left facial droop and drooling without motor deficits. Forehead sparing. Improvement in dysarthria. nih 1    LKW: 2200  NIH:  1    Allergies  has No Known Allergies. Medications  Prior to Admission medications    Medication Sig Start Date End Date Taking? Authorizing Provider   albuterol sulfate HFA (PROAIR HFA) 108 (90 Base) MCG/ACT inhaler INHALE TWO PUFFS BY MOUTH FOUR TIMES A DAY AS NEEDED 5/27/22   Adriana Meade,    tadalafil (CIALIS) 20 MG tablet Take 1 tablet by mouth as needed for Erectile Dysfunction 2/18/22   Tino Corey,    atorvastatin (LIPITOR) 40 MG tablet Take 1 tablet by mouth daily 1/31/22   Tino Corey DO   triamcinolone (KENALOG) 0.1 % ointment Apply to rash twice daily (not face, armpit or groin) 1/27/22   Bryce Aburto MD   clobetasol (TEMOVATE) 0.05 % ointment Apply to eczema twice daily (not face, armpit or groin) 1/28/21   Noemi Dennis MD   tacrolimus (PROTOPIC) 0.1 % ointment Apply to eczema on face twice daily 1/28/21   Noemi Dennis MD    Scheduled Meds:  Continuous Infusions:  PRN Meds:.sodium chloride flush  Past Medical History   has a past medical history of Asthma and Eczema.   Social History  Social History     Socioeconomic History    Marital status:      Spouse name: Not on file    Number of children: Not on file    Years of education: Not on file    Highest education level: Not on file   Occupational History    Not on file   Tobacco Use    Smoking status: Former Smoker     Packs/day: 1.00     Years: 15.00     Pack years: 15.00     Types: Cigarettes, Cigars     Quit date: 10/10/2017     Years since quittin.7    Smokeless tobacco: Never Used   Substance and Sexual Activity    Alcohol use: Yes     Alcohol/week: 4.2 standard drinks     Types: 5 Standard drinks or equivalent per week    Drug use: No    Sexual activity: Yes     Partners: Female   Other Topics Concern    Not on file   Social History Narrative    Not on file     Social Determinants of Health     Financial Resource Strain: Low Risk     Difficulty of Paying Living Expenses: Not hard at all   Food Insecurity: No Food Insecurity    Worried About [address] [employer] in the Last Year: Never true    [address] [employer] in the Last Year: Never true   Transportation Needs:     Lack of Transportation (Medical): Not on file    Lack of Transportation (Non-Medical):  Not on file   Physical Activity:     Days of Exercise per Week: Not on file    Minutes of Exercise per Session: Not on file   Stress:     Feeling of Stress : Not on file   Social Connections:     Frequency of Communication with Friends and Family: Not on file    Frequency of Social Gatherings with Friends and Family: Not on file    Attends Denominational Services: Not on file    Active Member of [address] [place] [address] or Organizations: Not on file    Attends Club or Organization Meetings: Not on file    Marital Status: Not on file   Intimate Partner Violence:     Fear of Current or Ex-Partner: Not on file    Emotionally Abused: Not on file    Physically Abused: Not on file    Sexually Abused: Not on file   Housing Stability:     Unable to Pay for Housing in the Last Year: Not on file    Number of [place] in the Last Year: Not on file    Unstable Housing in the Last Year: Not on file     Family History      Problem Relation Age of Onset    Kidney Disease Mother    Jeremie [place] Hypertension Mother        OBJECTIVE  BP (!) 148/87   Pulse 92   Temp 97.9 °F (36.6 °C) (Oral)   Resp 21   Ht 5' 10\" (1.778 m)   Wt 175 lb (79.4 kg)   SpO2 97%   BMI 25.11 kg/m²     NIH Stroke Scale  Interval: Baseline  Level of Consciousness (1a): Alert  LOC Questions (1b): Answers both correctly  LOC Commands (1c): Performs both tasks correctly  Best Gaze (2): Normal  Visual (3): No visual loss  Facial Palsy (4): (!) Minor paralysis  Motor Arm, Left (5a): No drift  Motor Arm, Right (5b): No drift  Motor Leg, Left (6a): No drift  Motor Leg, Right (6b): No drift  Limb Ataxia (7): Absent  Sensory (8): Normal  Best Language (9): No aphasia  Dysarthria (10): Normal  Extinction and Inattention (11): No abnormality  Total: 1  Pre-Morbid mRS: 1    Imaging:  Images were personally reviewed with VIZ. AI and PACS used to review images including:  CT brain without contrast: no hemorrhage  CTA imaging: no large vessel occlusion    Assessment    54 y.o. male who presents with history of htn, hld, and lkw 22:00 with symptoms when awoke with left facial droop and drooling without motor deficits. Differential DDx:  1. tia      Recommendations:  1. NIH 1  2. Recommend Outpatient Neurology Consult for further assessment and evaluation   3. May consider mri brain without aubree  4. Aspirin daily  5. If patient leaves [facility]a followup with neurology or return to ed if symptoms worsen. Discussed with ED Physician    At least 40 min of Telemedicine and time in conversation directly with ED staff and physician for the patient who is in imminent and life threatening deterioration without further treatment and evaluation. This Virtual Visit was conducted with patient's (and/or legal guardian's) consent, to provide telestroke consultation and necessary medical care.   Time spent examining patient, reviewing the images personally, reviewing the chart, perform high complexity decision making and speaking with the nursing staff regarding recommendations      Arturo Robertson MD, MD   Stroke, Neurocritical Care And/or [address] [facility] Stroke [address]  Electronically signed 6/27/2022 at 2:55 PM    Patient Location:  [facility] ED    Provider Location ([facility]): [place], [place]    This virtual visit was conducted via interactive/real-time audio/video.

## 2022-06-27 NOTE — ED PROVIDER NOTES
EMERGENCY DEPARTMENT ENCOUNTER    Pt Name: Kimberly Santamaria  MRN: [id number]  [place] 1970  Date of evaluation: 6/27/22  CHIEF COMPLAINT       Chief Complaint   Patient presents with    Aphasia    Numbness     HISTORY OF PRESENT ILLNESS   46-year-old male presents to the ED for facial droop. Patient states that he went to bed around 10 PM last night and did not have any symptoms. This morning when he woke up he had the facial droop. He noticed some drooling from the left side of his face. He does not have any numbness or weakness. He denies any vision changes. He has history of high cholesterol and asthma no other major medical problems. He reports he felt like he had some slurring of his speech but he believes this is starting to improve now. 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 No pertinent surgical history. CURRENT MEDICATIONS       Current Discharge Medication List      CONTINUE these medications which have NOT CHANGED    Details   albuterol sulfate HFA (PROAIR HFA) 108 (90 Base) MCG/ACT inhaler INHALE TWO PUFFS BY MOUTH FOUR TIMES A DAY AS NEEDED  Qty: 1 each, Refills: 1    Associated Diagnoses:  Moderate asthma without complication, unspecified whether persistent      tadalafil (CIALIS) 20 MG tablet Take 1 tablet by mouth as needed for Erectile Dysfunction  Qty: 30 tablet, Refills: 3    Associated Diagnoses: Erectile dysfunction, unspecified erectile dysfunction type      atorvastatin (LIPITOR) 40 MG tablet Take 1 tablet by mouth daily  Qty: 30 tablet, Refills: 5    Associated Diagnoses: Mixed hyperlipidemia      triamcinolone (KENALOG) 0.1 % ointment Apply to rash twice daily (not face, armpit or groin)  Qty: 454 g, Refills: 1    Associated Diagnoses: Intrinsic atopic dermatitis      clobetasol (TEMOVATE) 0.05 % ointment Apply to eczema twice daily (not face, armpit or groin)  Qty: 60 g, Refills: 5    Associated Diagnoses: Intrinsic atopic dermatitis      tacrolimus (PROTOPIC) 0.1 % ointment Apply to eczema on face twice daily  Qty: 30 g, Refills: 5    Associated Diagnoses: Intrinsic atopic dermatitis           ALLERGIES     has No Known Allergies. FAMILY HISTORY     He indicated that his mother is alive. He indicated that his father is alive. SOCIAL HISTORY       Social History     Tobacco Use    Smoking status: Former Smoker     Packs/day: 1.00     Years: 15.00     Pack years: 15.00     Types: Cigarettes, Cigars     Quit date: 10/10/2017     Years since quittin.7    Smokeless tobacco: Never Used   Substance Use Topics    Alcohol use: Yes     Alcohol/week: 4.2 standard drinks     Types: 5 Standard drinks or equivalent per week    Drug use: No     PHYSICAL EXAM     INITIAL VITALS: BP (!) 148/87   Pulse 92   Temp 97.9 °F (36.6 °C) (Oral)   Resp 21   Ht 5' 10\" (1.778 m)   Wt 175 lb (79.4 kg)   SpO2 97%   BMI 25.11 kg/m²    Physical Exam  Constitutional:       General: He is not in acute distress. Appearance: He is well-developed. HENT:      Head: Normocephalic. Eyes:      Pupils: Pupils are equal, round, and reactive to light. Cardiovascular:      Rate and Rhythm: Normal rate and regular rhythm. Heart sounds: Normal heart sounds. Pulmonary:      Effort: Pulmonary effort is normal. No respiratory distress. Breath sounds: Normal breath sounds. Abdominal:      General: Bowel sounds are normal.      Palpations: Abdomen is soft. Tenderness: There is no abdominal tenderness. Musculoskeletal:         General: Normal range of motion.    Skin:     General: Skin is availability and proximity of nurses, physicians, monitoring, diagnostic testing, and treatment. The patient was able to understand and state the risks and benefits of hospital admission. This was witnessed by the nurse and me. The patient had the opportunity to ask questions about medical conditions. The patient was treated to the extent that the patient allowed, and knows that may return for care at any time. Follow up has been discussed patient will call pcp tomorrow. CRITICAL CARE:       PROCEDURES:    Procedures    DIAGNOSTIC RESULTS   EKG:All EKG's are interpreted by the Emergency Department Physician who either signs or Co-signs this chart in the absence of a cardiologist.    EKG sinus rhythm ventricular rate 86  Motion artifact present  No significant ST changes    QTc 406    RADIOLOGY:All plain film, CT, MRI, and formal ultrasound images (except ED bedside ultrasound) are read by the radiologist, see reports below, unless otherwisenoted in MDM or here. CTA HEAD NECK W CONTRAST   Final Result   No acute abnormality or flow-limiting stenosis of the major arteries of the   head and neck. 5 mm lung nodule at the left lung apex. Follow-up recommendation is listed   below. RECOMMENDATIONS:   Fleischner Society guidelines for follow-up and management of incidentally   detected pulmonary nodules:      Single Solid Nodule:      Nodule size less than 6 mm   In a low-risk patient, no routine follow-up. In a high-risk patient, optional CT at 12 months. - Low risk patients include individuals with minimal or absent history of   smoking and other known risk factors. - High risk patients include individuals with a history or smoking or known   risk factors. Radiology 2017 http://pubs. rsna.org/doi/full/10.1148/radiol. 3944795766         CT Head WO Contrast   Final Result   No acute intracranial abnormality.            LABS: All lab results were reviewed by myself, and all abnormals are listed below. Labs Reviewed   CBC WITH AUTO DIFFERENTIAL - Abnormal; Notable for the following components:       Result Value    Immature Granulocytes 1 (*)     Absolute Lymph # 1.05 (*)     All other components within normal limits   BASIC METABOLIC PANEL W/ REFLEX TO MG FOR LOW K - Abnormal; Notable for the following components:    Glucose 129 (*)     All other components within normal limits   POC GLUCOSE FINGERSTICK - Abnormal; Notable for the following components:    POC Glucose 177 (*)     All other components within normal limits   TROPONIN   PROTIME-INR   APTT       EMERGENCY DEPARTMENTCOURSE:         Vitals:    Vitals:    06/27/22 1131 06/27/22 1133 06/27/22 1201 06/27/22 1203   BP:  (!) 148/87     Pulse:  92     Resp:  21     Temp:    97.9 °F (36.6 °C)   TempSrc:    Oral   SpO2:   97%    Weight: 175 lb (79.4 kg)      Height: 5' 10\" (1.778 m)          The patient was given the following medications while in the emergency department:  Orders Placed This Encounter   Medications    0.9 % sodium chloride bolus    iopamidol (ISOVUE-370) 76 % injection 75 mL    sodium chloride flush 0.9 % injection 10 mL    aspirin chewable tablet 324 mg    clopidogrel (PLAVIX) tablet 300 mg     CONSULTS:  None    FINAL IMPRESSION      1. Stroke-like symptoms          DISPOSITION/PLAN   DISPOSITION [place] 06/27/2022 01:37:08 PM      PATIENT REFERRED TO:  Keenan Garnica DO  [address]  [address]  [phone]    Schedule an appointment as soon as possible for a visit in 1 week      Seema Ng MD  [address]  [phone]    Schedule an appointment as soon as possible for a visit in 1 week      DISCHARGE MEDICATIONS:  Current Discharge Medication List        Waleska Caicedo MD  Attending Emergency Physician      Care during this encounter was due to an unprecedented national emergency due to COVID-19.             Diann Looney, MD  06/27/22 2465

## 2022-06-28 ENCOUNTER — APPOINTMENT (OUTPATIENT)
Dept: MRI IMAGING | Age: 52
DRG: 074 | End: 2022-06-28
Payer: COMMERCIAL

## 2022-06-28 VITALS
DIASTOLIC BLOOD PRESSURE: 80 MMHG | RESPIRATION RATE: 18 BRPM | BODY MASS INDEX: 24.77 KG/M2 | TEMPERATURE: 98.1 F | OXYGEN SATURATION: 94 % | HEART RATE: 90 BPM | SYSTOLIC BLOOD PRESSURE: 140 MMHG | WEIGHT: 173 LBS | HEIGHT: 70 IN

## 2022-06-28 LAB
ALBUMIN SERPL-MCNC: 4.8 G/DL (ref 3.5–5.2)
ALP BLD-CCNC: 91 U/L (ref 40–129)
ALT SERPL-CCNC: 37 U/L (ref 5–41)
ANION GAP SERPL CALCULATED.3IONS-SCNC: 10 MMOL/L (ref 9–17)
AST SERPL-CCNC: 32 U/L
BILIRUB SERPL-MCNC: 0.68 MG/DL (ref 0.3–1.2)
BUN BLDV-MCNC: 11 MG/DL (ref 6–20)
BUN/CREAT BLD: 14 (ref 9–20)
CALCIUM SERPL-MCNC: 9.2 MG/DL (ref 8.6–10.4)
CHLORIDE BLD-SCNC: 101 MMOL/L (ref 98–107)
CO2: 28 MMOL/L (ref 20–31)
CREAT SERPL-MCNC: 0.8 MG/DL (ref 0.7–1.2)
EKG ATRIAL RATE: 86 BPM
EKG ATRIAL RATE: 86 BPM
EKG P AXIS: 51 DEGREES
EKG P AXIS: 61 DEGREES
EKG P-R INTERVAL: 160 MS
EKG P-R INTERVAL: 168 MS
EKG Q-T INTERVAL: 340 MS
EKG Q-T INTERVAL: 364 MS
EKG QRS DURATION: 74 MS
EKG QRS DURATION: 78 MS
EKG QTC CALCULATION (BAZETT): 406 MS
EKG QTC CALCULATION (BAZETT): 435 MS
EKG R AXIS: 59 DEGREES
EKG R AXIS: 64 DEGREES
EKG T AXIS: 19 DEGREES
EKG T AXIS: 73 DEGREES
EKG VENTRICULAR RATE: 86 BPM
EKG VENTRICULAR RATE: 86 BPM
ESTIMATED AVERAGE GLUCOSE: 103 MG/DL
GFR AFRICAN AMERICAN: >60 ML/MIN
GFR NON-AFRICAN AMERICAN: >60 ML/MIN
GFR SERPL CREATININE-BSD FRML MDRD: ABNORMAL ML/MIN/{1.73_M2}
GLUCOSE BLD-MCNC: 100 MG/DL (ref 70–99)
HBA1C MFR BLD: 5.2 % (ref 4–6)
HCT VFR BLD CALC: 45.1 % (ref 40.7–50.3)
HEMOGLOBIN: 14.6 G/DL (ref 13–17)
LV EF: 65 %
LVEF MODALITY: NORMAL
MCH RBC QN AUTO: 31.2 PG (ref 25.2–33.5)
MCHC RBC AUTO-ENTMCNC: 32.4 G/DL (ref 28.4–34.8)
MCV RBC AUTO: 96.4 FL (ref 82.6–102.9)
NRBC AUTOMATED: 0 PER 100 WBC
PDW BLD-RTO: 13.7 % (ref 11.8–14.4)
PLATELET # BLD: 237 K/UL (ref 138–453)
PMV BLD AUTO: 10 FL (ref 8.1–13.5)
POTASSIUM SERPL-SCNC: 3.6 MMOL/L (ref 3.7–5.3)
RBC # BLD: 4.68 M/UL (ref 4.21–5.77)
SODIUM BLD-SCNC: 139 MMOL/L (ref 135–144)
TOTAL PROTEIN: 7.3 G/DL (ref 6.4–8.3)
WBC # BLD: 4.3 K/UL (ref 3.5–11.3)

## 2022-06-28 PROCEDURE — 97530 THERAPEUTIC ACTIVITIES: CPT

## 2022-06-28 PROCEDURE — 99232 SBSQ HOSP IP/OBS MODERATE 35: CPT | Performed by: HOSPITALIST

## 2022-06-28 PROCEDURE — 83036 HEMOGLOBIN GLYCOSYLATED A1C: CPT

## 2022-06-28 PROCEDURE — 93306 TTE W/DOPPLER COMPLETE: CPT

## 2022-06-28 PROCEDURE — 99254 IP/OBS CNSLTJ NEW/EST MOD 60: CPT | Performed by: PSYCHIATRY & NEUROLOGY

## 2022-06-28 PROCEDURE — 36415 COLL VENOUS BLD VENIPUNCTURE: CPT

## 2022-06-28 PROCEDURE — 92523 SPEECH SOUND LANG COMPREHEN: CPT

## 2022-06-28 PROCEDURE — 6370000000 HC RX 637 (ALT 250 FOR IP): Performed by: HOSPITALIST

## 2022-06-28 PROCEDURE — 97165 OT EVAL LOW COMPLEX 30 MIN: CPT

## 2022-06-28 PROCEDURE — 6370000000 HC RX 637 (ALT 250 FOR IP): Performed by: NURSE PRACTITIONER

## 2022-06-28 PROCEDURE — 85027 COMPLETE CBC AUTOMATED: CPT

## 2022-06-28 PROCEDURE — 70551 MRI BRAIN STEM W/O DYE: CPT

## 2022-06-28 PROCEDURE — 80053 COMPREHEN METABOLIC PANEL: CPT

## 2022-06-28 PROCEDURE — 97161 PT EVAL LOW COMPLEX 20 MIN: CPT

## 2022-06-28 PROCEDURE — 2580000003 HC RX 258: Performed by: NURSE PRACTITIONER

## 2022-06-28 RX ORDER — M-VIT,TX,IRON,MINS/CALC/FOLIC 27MG-0.4MG
1 TABLET ORAL DAILY
COMMUNITY

## 2022-06-28 RX ORDER — CETIRIZINE HYDROCHLORIDE 10 MG/1
10 TABLET ORAL DAILY
COMMUNITY

## 2022-06-28 RX ORDER — PREDNISONE 10 MG/1
TABLET ORAL
Qty: 30 TABLET | Refills: 0 | Status: SHIPPED | OUTPATIENT
Start: 2022-06-28 | End: 2022-07-10

## 2022-06-28 RX ORDER — CHLORAL HYDRATE 500 MG
1000 CAPSULE ORAL DAILY
COMMUNITY

## 2022-06-28 RX ORDER — PREDNISONE 20 MG/1
40 TABLET ORAL ONCE
Status: COMPLETED | OUTPATIENT
Start: 2022-06-28 | End: 2022-06-28

## 2022-06-28 RX ADMIN — ASPIRIN 81 MG: 81 TABLET, COATED ORAL at 10:06

## 2022-06-28 RX ADMIN — PREDNISONE 40 MG: 20 TABLET ORAL at 13:30

## 2022-06-28 RX ADMIN — CLOPIDOGREL BISULFATE 75 MG: 75 TABLET ORAL at 10:06

## 2022-06-28 RX ADMIN — SODIUM CHLORIDE, PRESERVATIVE FREE 10 ML: 5 INJECTION INTRAVENOUS at 10:06

## 2022-06-28 NOTE — PROGRESS NOTES
Physical Therapy  Facility/Department: [facility] PROGRESSIVE CARE  Physical Therapy Initial Assessment    Name: Marcelle Arriola  : 1970  MRN: [id number]  Date of Service: 2022    Discharge Recommendations:             Patient Diagnosis(es): The encounter diagnosis was Stroke-like symptoms. Past Medical History:  has a past medical history of Asthma and Eczema. Past Surgical History:  has no past surgical history on file.     Assessment   Assessment: L facial paresthesia and droop, Pt able to lift L eybrow  Specific Instructions for Next Treatment: No further PT intervention required at this time  Therapy Prognosis: Excellent  Decision Making: Low Complexity  No Skilled PT: Independent with functional mobility   Requires PT Follow-Up: No  Activity Tolerance  Activity Tolerance: Patient tolerated evaluation without incident     Plan   Plan  Specific Instructions for Next Treatment: No further PT intervention required at this time  Safety Devices  Type of Devices: Call light within reach,Left in bed  Restraints  Restraints Initially in Place: No     Restrictions  Restrictions/Precautions  Restrictions/Precautions: General Precautions  Required Braces or Orthoses?: No     Subjective   Pain: Pt denies pain  General  Chart Reviewed: Yes  Patient assessed for rehabilitation services?: Yes  Response To Previous Treatment: Not applicable  Family / Caregiver Present: No  Follows Commands: Within Functional Limits  General Comment  Comments: OK for PT per Oniel Guido RN         Social/Functional History  Social/Functional History  Lives With: Spouse,Daughter  Type of Home: House  Home Layout: Two level (FF to 2nd floor with rail on Lt)  Home Access: Stairs to enter without rails  Entrance Stairs - Number of Steps: 2  Bathroom Shower/Tub: Tub/Shower unit  Bathroom Toilet: Standard  Has the patient had two or more falls in the past year or any fall with injury in the past year?: No  ADL Assistance: Independent  Homemaking Assistance: Independent  Yard Work:  (Independent)  Driving:  (Independent)  Shopping:  (independent)  Homemaking Responsibilities: Yes  Ambulation Assistance: Independent  Transfer Assistance: Independent  Active : Yes  Mode of Transportation: Car  Occupation: Full time employment  Type of Occupation:   Leisure & Hobbies: kids and family  Vision/Hearing       Cognition         Objective   Heart Rate: 87  Heart Rate Source: Monitor  BP: 130/85  MAP (Calculated): 100  Resp: 14  SpO2: 96 %  O2 Device: None (Room air)     Observation/Palpation  Posture: Good  Observation: L facial droop  Gross Assessment  AROM: Within functional limits  Strength: Within functional limits  Coordination: Within functional limits  Tone: Normal  Sensation: Impaired (L face/cheek/eye parethesia)        Strength RLE  Strength RLE: WFL  Comment: 5/5 DENIZ LE's  Strength LLE  Strength LLE: WFL  Strength RUE  Comment: See OT maykel for B UE MMT        Balance  Sitting: Intact  Standing: Intact     Transfers  Sit to Stand: Independent  Stand to sit:  Independent  Stand Pivot Transfers: Independent  Ambulation  Surface: level tile  Device: No Device  Assistance: Independent  Gait Deviations: None  Distance: 215' x 1  More Ambulation?: No  Stairs/Curb  Stairs?: Yes  Stairs  # Steps : 14  Stairs Height: 6\"  Rails: None  Device: No Device  Assistance: Independent     Balance  Posture: Good  Sitting - Static: Good  Sitting - Dynamic: Good  Standing - Static: Good  Standing - Dynamic: Good           OutComes Score  Balance Score: 16 (06/28/22 1349)  Gait Score: 12 (06/28/22 1349)        Tinetti Total Score: 28 (06/28/22 1349)                                   AM-PAC Score  AM-PAC Inpatient Mobility Raw Score : 24 (06/28/22 1348)  AM-PAC Inpatient T-Scale Score : 61.14 (06/28/22 1348)  Mobility Inpatient CMS 0-100% Score: 0 (06/28/22 1348)  Mobility Inpatient CMS G-Code Modifier : [facility] (06/28/22 1348)          Goals  Short Term Goals  Time Frame for Short term goals: No further PT intervention required at this time  Additional Goals?: No  Patient Goals   Patient goals : Get MRI results and go home       Education  Patient Education  Education Given To: Patient  Education Provided: Role of Therapy  Education Provided Comments: Handouts: education and info on stroke and Bell's Palsy  Education Method: Printed Information/Hand-outs  Barriers to Learning: None  Education Outcome: Verbalized understanding      Therapy Time   Individual Concurrent Group Co-treatment   Time In 1236 [id number] (Treatment time 26 minutes)         Time Out 1153         Minutes / Lesly Landry [address] [place] [address]

## 2022-06-28 NOTE — RESULT ENCOUNTER NOTE
Triglycerides are slightly elevated, but much lower russell they were previously, and in an acceptable range. Do we have the status of his Adbry, and if the ophthalmologist is okay with us trying that? Have they seen him yet?

## 2022-06-28 NOTE — PROGRESS NOTES
Occupational Therapy  Facility/Department: [facility] PROGRESSIVE CARE  Occupational Therapy Initial Assessment    Name: Javy Banks  : 1970  MRN: [id number]  Date of Service: 2022    Discharge Recommendations:   Home Independently with family    OT Equipment Recommendations  Equipment Needed: No     RN reports patient is medically stable for therapy treatment this date. Chart reviewed prior to treatment and patient is agreeable for therapy. All lines intact and patient positioned comfortably at end of treatment. All patient needs addressed prior to ending therapy session. Patient Diagnosis(es): The encounter diagnosis was Stroke-like symptoms. Past Medical History:  has a past medical history of Asthma and Eczema. Past Surgical History:  has no past surgical history on file. Pt went to ER die to facial paralysis due to possibly CVA or Bell's Palsy. Pt waiting MRI. Assessment   Decision Making: Low Complexity  No Skilled OT: Independent with ADL's;Independent with functional mobility; At baseline function  REQUIRES OT FOLLOW-UP: No  Activity Tolerance  Activity Tolerance: Patient Tolerated treatment well    Pt completed functional mobility in room, hallway , and on stairs for home goal.    Plan     None    Restrictions  Restrictions/Precautions  Required Braces or Orthoses?: No    Subjective   General  Chart Reviewed: Yes  Patient assessed for rehabilitation services?: Yes  Family / Caregiver Present: No  Subjective  Subjective: numbness left mike of face and right eye, no pain     Social/Functional History  Social/Functional History  Lives With: Spouse,Daughter  Type of Home: House  Home Layout: Two level (FF to 2nd floor with rail on Lt)  Home Access: Stairs to enter without rails  Entrance Stairs - Number of Steps: 2  Bathroom Shower/Tub: Tub/Shower unit  Bathroom Toilet: Standard  Has the patient had two or more falls in the past year or any fall with injury in the past year?: No  ADL Assistance: Independent  Homemaking Assistance: [address] Work:  (Independent)  Driving:  (Independent)  Shopping:  (independent)  Homemaking Responsibilities: Yes  Ambulation Assistance: Independent  Transfer Assistance: Independent  Active : Yes  Mode of Transportation: Car  Occupation: Full time employment  Type of Occupation:   Leisure & Hobbies: kids and family       Objective   Heart Rate: 87  Heart Rate Source: Monitor  BP: 130/85  MAP (Calculated): 100  Resp: 14  SpO2: 96 %  O2 Device: None (Room air)  Vision Exceptions: Wears glasses for reading  Hearing: Within functional limits       Observation/Palpation  Posture: Good  Safety Devices  Type of Devices: Left in bed;Call light within reach     Toilet Transfers  Toilet Transfer: Independent  AROM: Within functional limits  Strength: Within functional limits  Sensation: Impaired (numbness left side of face and right eye)  ADL  Feeding: Independent  Grooming: Independent  UE Bathing: Independent  LE Bathing: Independent  UE Dressing: Independent  LE Dressing: Independent  Toileting: Independent  Tone RUE  RUE Tone: Normotonic  Tone LUE  LUE Tone: Normotonic  Coordination  Movements Are Fluid And Coordinated: Yes     Bed mobility  Rolling to Left: Independent  Rolling to Right: Independent  Supine to Sit: Independent  Sit to Supine: Independent  Scooting: Independent  Transfers  Sit to stand: Independent  Stand to sit:  Independent     Cognition  Overall Cognitive Status: WFL  Perception  Overall Perceptual Status: WFL               Education Given To: Patient  Education Provided: Role of Therapy;IADL Safety           Hand Dominance  Hand Dominance: Right            G-Code     OutComes Score                                                  AM-PAC Score        AM-PAC Inpatient Daily Activity Raw Score: 24 (06/28/22 1159)  AM-PAC Inpatient ADL T-Scale Score : 57.54 (06/28/22 1159)  ADL Inpatient CMS 0-100% Score: 0 (06/28/22

## 2022-06-28 NOTE — CARE COORDINATION
Case Management Initial Discharge Plan  Relda Ates,         Readmission Risk              Risk of Unplanned Readmission:  10             Met with:patient to discuss discharge plans. Information verified: address, contacts, phone number, , insurance Yes  PCP: Kayla Leung DO  Date of last visit: 22    Insurance Provider: [address] [place] [address] [employer]     Discharge Planning  Current Residence:  2 story home   Living Arrangements:    wife and 2 dtrs 19yo, 17 yo   Home has 2 stories/1-2 stairs to climb to enter 1 flight upper   Support Systems:   wife, daughters   Current Services PTA:  Na  Agency: na   Patient able to perform ADL's:Independent  DME in home:  Na   DME used to aid ambulation prior to admission:   None   DME used during admission:  None     Potential Assistance Needed:       Pharmacy: [facility]     Potential Assistance Purchasing Medications:     Does patient want to participate in local refill/ meds to beds program?       Patient agreeable to home care: No  [place] of choice provided:  n/a      Type of Home Care Services:     Patient expects to be discharged to:       Prior SNF/Rehab Placement and Facility: no   Agreeable to SNF/Rehab: No  [place] of choice provided: n/a   Evaluation: n/a    Expected Discharge date: Follow Up Appointment: Best Day/ Time:      Transportation provider: self   Transportation arrangements needed for discharge: No    Discharge Plan: Pt independent, works, drives. No DME. No needs-sl. left sided facial droop, declines any OP services. MRI brain    Spoke with pt at bedside, pleasant, cooperative. Ambulating in room without difficulty. Drove self here. Slight left facial droop. Would like to go home today.      Neuro consult       Electronically signed by Francia Cannon RN on 22 at 8:59 AM EDT

## 2022-06-28 NOTE — CONSULTS
NEUROLOGY INPATIENT CONSULT NOTE    6/28/2022         Jimy Kim is a  54 y.o. male admitted on 6/27/2022 with  Stroke-like symptoms [R29.90]      History is obtained mostly from the patient and the medical record and from the caregivers. Chart is reviewed and patient is examined. Briefly, this is a  54 y.o. male with HLD, asthma was admitted on 6/27/2022 with c/o waking up with left facial droop on 6/27/22. He also reported he felt like he had some slurring of his speech but it was improving in ED. Denied associated numbness, swallowing difficulties and vision changes. Vitals on admit 137/92, 92/min, 98.8 °F.  CT head with no acute intracranial abnormalities. CTA head and neck with no acute abnormality or flow-limiting stenosis of the major arteries of the head and neck. He was evaluated by stroke team.  As symptoms were improving; felt to be TIA. Advised daily aspirin and a brain and felt not a candidate for IV tPA. Patient stated that his speech difficulty has been stable but his left facial weakness continued. Denies hearing loss and earache. Denies recent febrile illnesses. Denies family history of stroke. No current facility-administered medications on file prior to encounter.      Current Outpatient Medications on File Prior to Encounter   Medication Sig Dispense Refill    albuterol sulfate HFA (PROAIR HFA) 108 (90 Base) MCG/ACT inhaler INHALE TWO PUFFS BY MOUTH FOUR TIMES A DAY AS NEEDED 1 each 1    tadalafil (CIALIS) 20 MG tablet Take 1 tablet by mouth as needed for Erectile Dysfunction 30 tablet 3    atorvastatin (LIPITOR) 40 MG tablet Take 1 tablet by mouth daily 30 tablet 5    triamcinolone (KENALOG) 0.1 % ointment Apply to rash twice daily (not face, armpit or groin) 454 g 1    clobetasol (TEMOVATE) 0.05 % ointment Apply to eczema twice daily (not face, armpit or groin) 60 g 5    tacrolimus (PROTOPIC) 0.1 % ointment Apply to eczema on face twice daily 30 g 5     Allergies: Hong Garcia has No Known Allergies. Past Medical History:   Diagnosis Date    Asthma     Eczema        History reviewed. No pertinent surgical history. Social History: Hong Garcia  reports that he quit smoking about 4 years ago. His smoking use included cigarettes and cigars. He has a 15.00 pack-year smoking history. He has never used smokeless tobacco. He reports current alcohol use of about 4.2 standard drinks of alcohol per week. He reports that he does not use drugs. Family History   Problem Relation Age of Onset    Kidney Disease Mother     Hypertension Mother        Current Medications:     atorvastatin  40 mg Oral Nightly    clobetasol   Topical BID    tacrolimus   Topical BID    sodium chloride flush  10 mL IntraVENous 2 times per day    enoxaparin  40 mg SubCUTAneous Daily    clopidogrel  75 mg Oral Daily    aspirin  81 mg Oral Daily     PRN Meds include: albuterol sulfate HFA, sodium chloride flush, sodium chloride, ondansetron **OR** ondansetron, magnesium hydroxide, perflutren lipid microspheres    ROS:   Constitutional Negative for fever and chills   HEENT Negative for ear discharge, ear pain, nosebleed   Eyes Negative for photophobia, pain and discharge   Respiratory Negative for hemoptysis and sputum   Cardiovascular Negative for orthopnea, claudication and PND   Gastrointestinal Negative for abdominal pain, diarrhea, blood in stool   Musculoskeletal Negative for joint pain, negative for myalgia   Skin Negative for rash or itching   Endo/heme/allergies Negative for polydipsia, environmental allergy   Psychiatric Negative for suicidal ideation.   Patient is not anxious           Objective:   BP (!) 117/91   Pulse 81   Temp 97.3 °F (36.3 °C) (Oral)   Resp 16   Wt 173 lb 3.2 oz (78.6 kg)   SpO2 95%   BMI 24.85 kg/m²     Blood pressure range: Systolic (47OBZ), MRF:781 , Min:117 , HGR:874   ; Diastolic (01QZN), EPI:61, Min:78, Max:93      Continuous infusions:    sodium chloride         ON EXAMINATION:  GENERAL  Appears comfortable and in no distress   HEENT  NC/ AT   NECK  Supple and no bruits heard   Cardiovascular  S1, S2 heard; radial pulse intact   MENTAL STATUS:  Alert, oriented, intact memory, no confusion, normal speech, normal language, no hallucination or delusion   CRANIAL NERVES: II     -       Pupils reactive b/l visual acuity: 20/30 OU; Fundus exam: intact venous pulsations;  Visual fields intact to confrontation  III,IV,VI -  EOMs full, no afferent defect, no                      SATHISH, no ptosis  V     -     Normal facial sensation  VII    -     Left lower facial weakness  VIII   -     Intact hearing  IX,X -     Symmetrical palate  XI    -     Symmetrical shoulder shrug  XII   -     Midline tongue, no atrophy    MOTOR FUNCTION:  Normal bulk, normal tone, normal power;  no involuntary movements, no tremor   SENSORY FUNCTION:  Normal touch, normal pin, normal vibration in all 4 extre   CEREBELLAR FUNCTION:  Intact fine motor control over upper limbs   REFLEX FUNCTION:  Symmetric, no perverted reflex, equivocal left and flexor right plantar response    STATION and GAIT  normal station and no gait ataxia         Data:    Lab Results:     Lab Results   Component Value Date    CHOL 216 (H) 06/22/2022    LDLCHOLESTEROL 136 (H) 06/22/2022    HDL 45 06/22/2022    TRIG 175 (H) 06/22/2022    ALT 37 06/28/2022    AST 32 06/28/2022    INR 1.0 06/27/2022     Hematology:  Recent Labs     06/27/22  1140 06/28/22  0533   WBC 4.1 4.3   HGB 14.2 14.6   HCT 44.1 45.1    237   INR 1.0  --      Chemistry:  Recent Labs     06/27/22  1140 06/28/22  0533    139   K 4.2 3.6*   CL 99 101   CO2 26 28   GLUCOSE 129* 100*   BUN 16 11   CREATININE 1.12 0.80   CALCIUM 9.2 9.2     Recent Labs     06/28/22  0533   PROT 7.3   LABALBU 4.8   AST 32   ALT 37       No results found for: PHENYTOIN, PHENYTOIN, VALPROATE, CBMZ        Diagnostic data reviewed:  CT head with no acute intracranial abnormalities. CTA head and neck with no acute abnormality or flow-limiting stenosis of the major arteries of the head and neck. Impression and Plan: Mr. Calin Berry is a 54 y.o. male with   New onset left facial droop; possible left facial palsy,, likely cva and possible less likely differential includes Bell's palsy. Already evaluated by stroke team; on aspirin along with statin therapy. CTA unremarkable. MRI brain pending. While working for possible central etiology; recommend 1 dose of prednisone while waiting for MRI brain. If brain MRI shows evidence of structural etiology; patient needs to have further work-up. Discussed in detail with the patient and he was understanding those instructions. Passed bedside swallow; on regular diet. Comorbid conditions include hyperlipidemia and bronchial asthma  Care plan is discussed with patient and his nurse. Will follow with you. Thank you for consultation. This note was partially created using voice recognition software and is inherently subject to errors including those of syntax and \"sound alike\" substitutions which may escape proofreading. In such instances, original meaning may be extrapolated by contextual derivation.   Hema Carpenter MD 6/28/2022 11:47 AM

## 2022-06-28 NOTE — PROGRESS NOTES
Transitions of Care Pharmacy Service   Medication Review    The patient's list of current home medications has been reviewed. Source(s) of information: Surescripts, Patient    Based on information provided by the above source(s), I have updated the patient's home med list as described below. Please review the ACTION REQUESTED section of this note below for any discrepancies on current hospital orders. I changed or updated the following medications on the patient's home medication list:  Removed Clobetasol ointment  Tacrolimus ointment  Triamcinolone ointment     Added Fish oil 1000mg daily  Cetirizine 10mg daily  Multivitamin daily     Adjusted   None       PROVIDER ACTION REQUESTED  Medications that need to be addressed by a physician/nurse practitioner:    Medication Action Requested     Cetirizine, multivitamin   Please reorder as appropriate    Clobetasol, tacrolimus  Not current home medications- please discontinue as appropriate. Please feel free to call me with any questions about this encounter. Thank you.     Onle Montoya [facility]   Transitions of Care Pharmacy Service  Phone:  [phone]  Fax: [phone]      Electronically signed by Onel Montoya [facility] on 6/28/2022 at 4:00 PM       Medications Prior to Admission: Omega-3 Fatty Acids (FISH OIL) 1000 MG CAPS, Take 1,000 mg by mouth daily  cetirizine (ZYRTEC) 10 MG tablet, Take 10 mg by mouth daily  Multiple Vitamins-Minerals (THERAPEUTIC MULTIVITAMIN-MINERALS) tablet, Take 1 tablet by mouth daily  albuterol sulfate HFA (PROAIR HFA) 108 (90 Base) MCG/ACT inhaler, INHALE TWO PUFFS BY MOUTH FOUR TIMES A DAY AS NEEDED  tadalafil (CIALIS) 20 MG tablet, Take 1 tablet by mouth as needed for Erectile Dysfunction  atorvastatin (LIPITOR) 40 MG tablet, Take 1 tablet by mouth daily

## 2022-06-28 NOTE — PLAN OF CARE
Problem: Discharge Planning  Goal: Discharge to home or other facility with appropriate resources  6/28/2022 1444 by Radha Guo RN  Outcome: Progressing     Problem: Safety - Adult  Goal: Free from fall injury  6/28/2022 1444 by Radha Guo RN  Outcome: Adequate for Discharge     Problem: ABCDS Injury Assessment  Goal: Absence of physical injury  6/28/2022 1444 by Radha Guo RN  Outcome: Adequate for Discharge

## 2022-06-28 NOTE — PROGRESS NOTES
Speech Language Pathology  Facility/Department: [facility] PROGRESSIVE CARE  Initial Speech/Language/Cognitive Assessment    NAME: Lucille Johnston  : 1970   MRN: [id number]  ADMISSION DATE: 2022  ADMITTING DIAGNOSIS: has Chronic eczema; Mild intermittent asthma without complication; Erectile dysfunction; Mixed hyperlipidemia; Scleritis of right eye; Stroke-like symptoms; Facial droop; Left upper lobe pulmonary nodule; and Difficulty with speech on their problem list.  DATE ONSET: 22    Date of Eval: 2022   Evaluating Therapist: JALEN Appiah    RECENT RESULTS  CT OF HEAD/MRI: no acute findings CT, MRI pending     Primary Complaint: admitted on 2022 with c/o waking up with left facial droop on 22. He also reported he felt like he had some slurring of his speech but it was improving in ED. Denied associated numbness, swallowing difficulties and vision changes. Clinically appears to be Bell's Palsy per physician note however MRI pending this afternoon to r/o stroke. *Speech evaluation ordered as part of standard stroke workup. RN reports pt passed bedside swallow screen. BSE not warranted at this time. Pain:  Pain Assessment  Pain Assessment: None - Denies Pain    Assessment:  Diagnosis: Communication and cognition grossly WFL. Pt presents with mild left facial droop and decreased labial seal when eating/drinking, requiring him to hold his lips together with his hand. No significant speech difficulties noted , however, pt c/o difficulties producing \"sp\" words. DDKs WFL. Intelligibility 100%. Naming 100%. Following directions and comprehension 100%. Pt able to complete all problem solving tasks independently. A&Ox4. No other visual or cognitive deficits observed. Pt has no further concerns regarding his speech/language/cognition. ST will sign off at this time.  He has been tolerating a regular diet and thin liquids therefore BSE not warranted at this time.    Recommendations:  Requires SLP Intervention: No    Subjective:        Social/Functional History  Lives With: Spouse;Daughter  Type of Home: House  Vision  Vision Exceptions: Wears glasses for reading  Hearing  Hearing: Within functional limits       Objective:     Oral/Motor  Oral Hygiene: Clean  Gag: No Impairment    Auditory Comprehension  Comprehension: Within Functional Limits      Expression  Primary Mode of Expression: Verbal    Verbal Expression  Verbal Expression: Within functional limits    Pragmatics/Social Functioning  Pragmatics: Within functional limits    Cognition:      Orientation  Overall Orientation Status: Within Normal Limits  Attention  Attention: Within Functional Limits  Memory  Memory: Within Functional Limits  Problem Solving  Problem Solving: Within Functional Limits  Numeric Reasoning  Numeric Reasoning: Within Functional Limits  Abstract Reasoning  Abstract Reasoning: Within Functional Limits  Safety/Judgment  Safety/Judgment: Within Functional Limits      Prognosis:  Individuals consulted  Consulted and agree with results and recommendations: Patient    Education:  Patient Education: yes  Patient Education Response: Verbalizes understanding       Therapy Time:   Individual Concurrent Group Co-treatment   Time In [phone]         Time Out 1447         Minutes 14            Timed Code Treatment Minutes: [address] [facility] [place] [address],[facility] [address] CCC-SLP   Speech-Language Pathologist    6/28/2022 2:55 PM

## 2022-06-28 NOTE — PROGRESS NOTES
[facility]  Office: [address], DO, Enriqueraf Melton, DO, Damien Medon, DO, Bossman Zendejas Blood, DO, Nereyda Sherwood MD, Radha Silverio MD, Hanh Tirado MD, Jethro Galdamez MD, Roderick Badillo MD, Daryl Quinn MD, Kane Moise MD, Eloise Blas, DO, Asaf Perera MD,  Sabino Hartman, DO, Yasmine Demarco MD, Osvaldo Magallanes MD, Cyndee Sanchez, DO, Marcelo Koehler MD, Anali Lambert MD, Carmine Calvin DO, Silver Lau MD, Cb Kyle MD, Jonny Pyle, [facility], [facility], [facility], Fransisco Phillips, CNP, Hilary Cross, CNP, Miya Ray, CNP, Jose Lisa, CNP, Andree Rojas PA-C, Cristiane Matta, [facility], Jaye Cespedes, CNP, Eduardo Tolbert, CNP, Con Distance, CNP, Clark Chairez, CNS, Lieutenant Garcia, [facility], Kath Garber, [facility], [place], [facility], Dino Lugo    Progress Note    6/28/2022    9:07 AM    Name:   Jo Ann Morrow  MRN:     [id number]     [place]:      [de-identified]   Room:   [facility]/1002-02   Day:  1  Admit Date:  6/27/2022  4:35 PM    PCP:   Belkis Morrison DO  Code Status:  Full Code    Subjective:     C/C:   Chief Complaint   Patient presents with    Aphasia     Pt seen earlier and left; back for admission     Patient seen in follow-up for left-sided facial paralysis, patient states \"I feel a little better\"    Interval History Status: improved. Patient tells me that some of his facial paralysis has improved overnight. Clinically this appears to be a Bell's palsy however he does have risk factors for stroke. Outside of his facial droop no other focal neurological deficits are identified. I did have a long discussion with him regarding risk factors including his tobacco abuse/vaping. We discussed alternatives to vaping and quitting smoking. He has been taking his Lipitor daily and as instructed. At this point in time MRI as well as echocardiogram are pending.   In the event that MRI is negative I suspect that this is Bell's palsy and I will place him on a prednisone taper. If MRI is consistent with a stroke further work-up and titration of medications will occur. Patient denies any questions or concerns at this point in time. Brief History: This very pleasant 32-year-old male awoke with a left-sided facial droop. He did not immediately present to the hospital.  By the time the patient presented to the hospital he was well outside of any type of stroke intervention however with low NIH not a candidate for tPA or neuro intervention. Review of Systems:     Constitutional:  negative for chills, fevers, sweats  Respiratory:  negative for cough, dyspnea on exertion, shortness of breath, wheezing  Cardiovascular:  negative for chest pain, chest pressure/discomfort, lower extremity edema, palpitations  Gastrointestinal:  negative for abdominal pain, constipation, diarrhea, nausea, vomiting  Neurological:  negative for dizziness, headache. Positive for facial droop and left    Medications: Allergies:  No Known Allergies    Current Meds:   Scheduled Meds:    atorvastatin  40 mg Oral Nightly    clobetasol   Topical BID    tacrolimus   Topical BID    sodium chloride flush  10 mL IntraVENous 2 times per day    enoxaparin  40 mg SubCUTAneous Daily    clopidogrel  75 mg Oral Daily    aspirin  81 mg Oral Daily     Continuous Infusions:    sodium chloride       PRN Meds: albuterol sulfate HFA, sodium chloride flush, sodium chloride, ondansetron **OR** ondansetron, magnesium hydroxide, perflutren lipid microspheres    Data:     Past Medical History:   has a past medical history of Asthma and Eczema. Social History:   reports that he quit smoking about 4 years ago. His smoking use included cigarettes and cigars. He has a 15.00 pack-year smoking history. He has never used smokeless tobacco. He reports current alcohol use of about 4.2 standard drinks of alcohol per week.  He reports that he does not use drugs. Family History:   Family History   Problem Relation Age of Onset    Kidney Disease Mother     Hypertension Mother        Vitals:  BP (!) 117/91   Pulse 81   Temp 97.3 °F (36.3 °C) (Oral)   Resp 16   Wt 173 lb 3.2 oz (78.6 kg)   SpO2 95%   BMI 24.85 kg/m²   Temp (24hrs), Av.9 °F (36.6 °C), Min:97.3 °F (36.3 °C), Max:98.8 °F (37.1 °C)    Recent Labs     22  1127   POCGLU 177*       I/O (24Hr): No intake or output data in the 24 hours ending 22 0907    Labs:  Hematology:  Recent Labs     22  1140 22  0533   WBC 4.1 4.3   RBC 4.53 4.68   HGB 14.2 14.6   HCT 44.1 45.1   MCV 97.4 96.4   MCH 31.3 31.2   MCHC 32.2 32.4   RDW 14.0 13.7    237   MPV 9.8 10.0   INR 1.0  --      Chemistry:  Recent Labs     22  1140 22  0533    139   K 4.2 3.6*   CL 99 101   CO2 26 28   GLUCOSE 129* 100*   BUN 16 11   CREATININE 1.12 0.80   ANIONGAP 12 10   LABGLOM >60 >60   GFRAA >60 >60   CALCIUM 9.2 9.2   TROPHS 8  --      Recent Labs     22  1127 22  0533   PROT  --  7.3   LABALBU  --  4.8   AST  --  32   ALT  --  37   ALKPHOS  --  91   BILITOT  --  0.68   POCGLU 177*  --      ABG:No results found for: POCPH, PHART, PH, POCPCO2, JLM0ZYI, PCO2, POCPO2, PO2ART, PO2, POCHCO3, VXX2DOL, HCO3, NBEA, PBEA, BEART, BE, THGBART, THB, RZV2GXP, WDCI0LVT, S5TAKXQF, O2SAT, FIO2  Lab Results   Component Value Date/Time    SPECIAL NOT REPORTED 03/10/2014 01:00 PM     Lab Results   Component Value Date/Time    CULTURE (A) 03/10/2014 01:00 PM     STAPHYLOCOCCUS AUREUS LIGHT GROWTH This isolate is methicillin susceptible. CULTURE  03/10/2014 01:00 PM     Performed at [place] [address] [phone]       Radiology:  CT Head WO Contrast    Result Date: 2022  No acute intracranial abnormality.      CTA HEAD NECK W CONTRAST    Result Date: 2022  No acute abnormality or flow-limiting stenosis of the major arteries of the head and neck. 5 mm lung nodule at the left lung apex. Follow-up recommendation is listed below. RECOMMENDATIONS: Fleischner Society guidelines for follow-up and management of incidentally detected pulmonary nodules: Single Solid Nodule: Nodule size less than 6 mm In a low-risk patient, no routine follow-up. In a high-risk patient, optional CT at 12 months. - Low risk patients include individuals with minimal or absent history of smoking and other known risk factors. - High risk patients include individuals with a history or smoking or known risk factors. Radiology 2017 http://pubs. rsna.org/doi/full/10.1148/radiol. 1460042483       Physical Examination:        General appearance:  alert, cooperative and no distress  Mental Status:  oriented to person, place and time and normal affect  Lungs:  clear to auscultation bilaterally, normal effort  Heart:  regular rate and rhythm, no murmur  Abdomen:  soft, nontender, nondistended, normal bowel sounds, no masses, hepatomegaly, splenomegaly  Extremities:  no edema, redness, tenderness in the calves  Skin:  no gross lesions, rashes, induration  Neurology: Left-sided facial droop noted    Assessment:        Hospital Problems           Last Modified POA    * (Principal) Stroke-like symptoms 6/27/2022 Yes    Facial droop 6/27/2022 Yes    Mild intermittent asthma without complication (Chronic) 7/78/1137 Yes    Mixed hyperlipidemia 6/27/2022 Yes    Left upper lobe pulmonary nodule 6/27/2022 Yes    Overview Signed 6/27/2022  6:29 PM by Beverly Dockery, DO     6 mm nodule noted on CTA head and neck, will need outpatient low-dose CT scan chest to further evaluate for pulmonary nodules               Plan:        1. Facial droop  1. MRI/echocardiogram today  1. If negative suspect Bell's palsy  1. Steroids on discharge if MRI negative  2. If positive titrate medications aggressively  2. Lung nodule  1. Outpatient follow-up  2. Discussed with patient  3.  Strongly encourage vaping cessation  3. Hyperlipidemia  1.  Continue statin    Weight testing, anticipate discharge home later today    Chloé Borges DO  6/28/2022  9:07 AM

## 2022-06-29 NOTE — DISCHARGE SUMMARY
[facility]  Office: [address], DO, Henrique Justin, DO, Anna Marie Leyva, DO, Manuelaemily Angelica Blood, DO, Nuris Burnett MD, Sangeeta Bejarano MD, Stephanie Hodgson MD, Fernando Marquez MD, Dick Snow MD, Lanette Caraballo MD, Randa Walsh MD, Jared Wesley, DO, Mary Hodgson MD,  Daniel Ibanez, DO, Lizz German MD, Stella Ocampo MD, Richard Blas, DO, Vikas Still MD, Stacy Trejo MD, Tammi Waldron, DO, Kathleen Callahan MD, Everton Dove MD, Brian Weeks, [facility], [facility], CNP, Robert Becerra, CNP, Alpha Grain, CNP, Jarrod Ing, CNP, Mitch Wilson, CNP, CESAR DillC, Sarath Carbone, DNP, Lobito Park, CNP, jA Hernandez, CNP, Roger Walker, CNP, Savannah Malin, CNS, Lico José, [facility], Hi Worrell, CNP, Jan Holcomb, CNP, Michoacano Rivas, [facility]    Discharge Summary     Patient ID: Zonia Contreras  :  1970   MRN: [id number]     ACCOUNT:  [de-identified]   Patient's PCP: Shelby Viera DO  Admit Date: 2022   Discharge Date: 2022    Length of Stay: 1  Code Status:  Full Code  Admitting Physician: Chloé Borges DO  Discharge Physician: Chloé Borges DO     Active Discharge Diagnoses:     Hospital Problem Lists:  Principal Problem:    Stroke-like symptoms  Active Problems:    Facial droop    Difficulty with speech    Mild intermittent asthma without complication    Mixed hyperlipidemia    Left upper lobe pulmonary nodule  Resolved Problems:    * No resolved hospital problems. *      Admission Condition:  fair     Discharged Condition: good    Hospital Stay:     Hospital Course:  Zonia Contreras is a 54 y.o. male who was admitted for the management of  Stroke-like symptoms , presented to ER with Aphasia (Pt seen earlier and left; back for admission)    This very pleasant 56-year-old male presents to the hospital with left-sided facial droop.   He awoke with it early in the morning and noticed that while brushing his teeth that he was drooling out of the left aspect of his mouth. He did not originally presented to the hospital and instead went to work. His coworker encouraged him to come to the hospital he was admitted for stroke rule out. Clinically it appeared to be Bell's palsy. The patient underwent echocardiogram which was unremarkable. MRI of the brain showed some nonspecific white matter changes but no acute stroke was identified. Ultimately final diagnosis is that of Bell's palsy. He was placed on a prednisone taper and discharged home. He strongly encouraged to continue his Lipitor. He is also been encouraged to discontinue vaping. Patient is discharged in stable condition. Significant therapeutic interventions: As above    Significant Diagnostic Studies:   Labs / Micro:  CBC:   Lab Results   Component Value Date    WBC 4.3 06/28/2022    RBC 4.68 06/28/2022    HGB 14.6 06/28/2022    HCT 45.1 06/28/2022    MCV 96.4 06/28/2022    MCH 31.2 06/28/2022    MCHC 32.4 06/28/2022    RDW 13.7 06/28/2022     06/28/2022     BMP:    Lab Results   Component Value Date    GLUCOSE 100 06/28/2022     06/28/2022    K 3.6 06/28/2022     06/28/2022    CO2 28 06/28/2022    ANIONGAP 10 06/28/2022    BUN 11 06/28/2022    CREATININE 0.80 06/28/2022    BUNCRER 14 06/28/2022    CALCIUM 9.2 06/28/2022    LABGLOM >60 06/28/2022    GFRAA >60 06/28/2022    GFR      06/28/2022        Radiology:  CT Head WO Contrast    Result Date: 6/27/2022  No acute intracranial abnormality. CTA HEAD NECK W CONTRAST    Result Date: 6/27/2022  No acute abnormality or flow-limiting stenosis of the major arteries of the head and neck. 5 mm lung nodule at the left lung apex. Follow-up recommendation is listed below.  RECOMMENDATIONS: Fleischner Society guidelines for follow-up and management of incidentally detected pulmonary nodules: Single Solid Nodule: Nodule size less than 6 mm In a low-risk patient, no routine follow-up. In a high-risk patient, optional CT at 12 months. - Low risk patients include individuals with minimal or absent history of smoking and other known risk factors. - High risk patients include individuals with a history or smoking or known risk factors. Radiology 2017 http://pubs. rsna.org/doi/full/10.1148/radiol. 2103663179     MRI brain without contrast    Result Date: 6/28/2022  1. No acute intracranial abnormality per unenhanced brain MRI. No acute stroke. 2.  Nonspecific mild bilateral white matter FLAIR signal hyperintensities may relate to mild/early chronic small vessel ischemic disease. Consultations:    Consults:     Final Specialist Recommendations/Findings:   IP CONSULT TO INTERNAL MEDICINE  IP CONSULT TO NEUROLOGY      The patient was seen and examined on day of discharge and this discharge summary is in conjunction with any daily progress note from day of discharge. Discharge plan:     Disposition: Home    Physician Follow Up:     Nimisha Johnson [facility] [address] [place] [address] 3  [phone]      As needed, If symptoms worsen    Fatmata Mariano DO  [address]  [address]  [phone]    In 1 week  Follow up regarding Left Lung Nodule: Needs repeat CT 3 months       Requiring Further Evaluation/Follow Up POST HOSPITALIZATION/Incidental Findings: Incidental left upper lung nodule noted, patient instructed to follow-up with primary care physician for repeat CT scanning in the next 3 months    Diet: regular diet    Activity: As tolerated    Instructions to Patient: Follow-up with PCP for screening CT as mentioned    Discharge Medications:      Medication List      START taking these medications    predniSONE 10 MG tablet  Commonly known as: DELTASONE  Take 4 tablets by mouth daily for 3 days, THEN 3 tablets daily for 3 days, THEN 2 tablets daily for 3 days, THEN 1 tablet daily for 3 days.   Start taking on: June 28, 2022        CONTINUE taking these medications    albuterol sulfate  (90 Base) MCG/ACT inhaler  Commonly known as: ProAir HFA  INHALE TWO PUFFS BY MOUTH FOUR TIMES A DAY AS NEEDED     atorvastatin 40 MG tablet  Commonly known as: LIPITOR  Take 1 tablet by mouth daily     cetirizine 10 MG tablet  Commonly known as: ZYRTEC     fish oil 1000 MG Caps     tadalafil 20 MG tablet  Commonly known as: Cialis  Take 1 tablet by mouth as needed for Erectile Dysfunction     therapeutic multivitamin-minerals tablet           Where to Get Your Medications      These medications were sent to [facility] [id number]  [address] [facility], [place] [address]    Phone: [phone]   · predniSONE 10 MG tablet         No discharge procedures on file. Time Spent on discharge is  20 mins in patient examination, evaluation, counseling as well as medication reconciliation, prescriptions for required medications, discharge plan and follow up. Electronically signed by   Fely Hernández DO  6/28/2022  9:48 PM      Thank you Dr. Jay Adkins DO for the opportunity to be involved in this patient's care.

## 2022-06-29 NOTE — PLAN OF CARE
Problem: Discharge Planning  Goal: Discharge to home or other facility with appropriate resources  6/28/2022 2220 by Elvie Whiteside RN  Outcome: Completed  6/28/2022 1444 by Johnny Castañeda RN  Outcome: Progressing  6/28/2022 1442 by Johnny Castañeda RN  Outcome: Not Progressing     Problem: Safety - Adult  Goal: Free from fall injury  6/28/2022 2220 by Elvie Whiteside RN  Outcome: Completed  6/28/2022 1444 by Johnny Castañeda RN  Outcome: Adequate for Discharge  6/28/2022 1442 by Johnny Castañeda RN  Outcome: Not Progressing     Problem: ABCDS Injury Assessment  Goal: Absence of physical injury  6/28/2022 2220 by Elvie Whiteside RN  Outcome: Completed  Flowsheets (Taken 6/28/2022 1445 by Johnny Castañeda RN)  Absence of Physical Injury: Implement safety measures based on patient assessment  6/28/2022 1444 by Johnny Castañeda RN  Outcome: Adequate for Discharge  6/28/2022 1442 by Johnny Castañeda RN  Outcome: Not Progressing     Problem: Musculoskeletal - Adult  Goal: Return mobility to safest level of function  Outcome: Completed  Goal: Maintain proper alignment of affected body part  Outcome: Completed  Goal: Return ADL status to a safe level of function  Outcome: Completed

## 2022-08-23 ENCOUNTER — OFFICE VISIT (OUTPATIENT)
Dept: FAMILY MEDICINE CLINIC | Age: 52
End: 2022-08-23
Payer: COMMERCIAL

## 2022-08-23 VITALS
BODY MASS INDEX: 25.11 KG/M2 | DIASTOLIC BLOOD PRESSURE: 80 MMHG | WEIGHT: 175 LBS | SYSTOLIC BLOOD PRESSURE: 130 MMHG | HEART RATE: 87 BPM | TEMPERATURE: 97.3 F | OXYGEN SATURATION: 99 %

## 2022-08-23 DIAGNOSIS — Z09 HOSPITAL DISCHARGE FOLLOW-UP: ICD-10-CM

## 2022-08-23 DIAGNOSIS — G51.0 BELL'S PALSY: Primary | ICD-10-CM

## 2022-08-23 DIAGNOSIS — M79.674 TOE PAIN, BILATERAL: ICD-10-CM

## 2022-08-23 DIAGNOSIS — M79.675 TOE PAIN, BILATERAL: ICD-10-CM

## 2022-08-23 DIAGNOSIS — J45.20 MILD INTERMITTENT ASTHMA WITHOUT COMPLICATION: Chronic | ICD-10-CM

## 2022-08-23 DIAGNOSIS — L30.9 CHRONIC ECZEMA: ICD-10-CM

## 2022-08-23 DIAGNOSIS — E78.2 MIXED HYPERLIPIDEMIA: ICD-10-CM

## 2022-08-23 PROBLEM — R29.810 FACIAL DROOP: Status: RESOLVED | Noted: 2022-06-27 | Resolved: 2022-08-23

## 2022-08-23 PROCEDURE — 99214 OFFICE O/P EST MOD 30 MIN: CPT | Performed by: FAMILY MEDICINE

## 2022-08-23 PROCEDURE — 1111F DSCHRG MED/CURRENT MED MERGE: CPT | Performed by: FAMILY MEDICINE

## 2022-08-23 ASSESSMENT — ENCOUNTER SYMPTOMS
DIFFICULTY BREATHING: 0
GASTROINTESTINAL NEGATIVE: 1
CHEST TIGHTNESS: 0
HOARSE VOICE: 0
SHORTNESS OF BREATH: 0
SPUTUM PRODUCTION: 0
RESPIRATORY NEGATIVE: 1
HEMOPTYSIS: 0
SORE THROAT: 0
WHEEZING: 0
FREQUENT THROAT CLEARING: 0
COUGH: 0
TROUBLE SWALLOWING: 0
HEARTBURN: 0
EYES NEGATIVE: 1
ALLERGIC/IMMUNOLOGIC NEGATIVE: 1
RHINORRHEA: 0

## 2022-08-23 NOTE — PROGRESS NOTES
Post-Discharge Transitional Care Follow Up      Serafin Oliveros   YOB: 1970    Date of Office Visit:  2022  Date of Hospital Admission: 22  Date of Hospital Discharge: 22  Readmission Risk Score (high >=14%. Medium >=10%):Readmission Risk Score: 3.9 ( )      Care management risk score Rising risk (score 2-5) and Complex Care (Scores >=6): No Risk Score On File     Non face to face  following discharge, date last encounter closed (first attempt may have been earlier): *No documented post hospital discharge outreach found in the last 14 days     Call initiated 2 business days of discharge: *No response recorded in the last 14 days     Bell's palsy  Assessment & Plan:   Resolved - reviewed hospital notes  Mixed hyperlipidemia  Assessment & Plan:   Well-controlled, continue current medications, continue current treatment plan, medication adherence emphasized and lifestyle modifications recommended  Chronic eczema  Assessment & Plan:   Monitored by specialist- no acute findings meriting change in the plan  Mild intermittent asthma without complication  Assessment & Plan:   Well-controlled, continue current medications, continue current treatment plan and medication adherence emphasized  Toe pain, bilateral  Comments:  Concern for gout  Orders:  -     Uric Acid; Future  Hospital discharge follow-up  -     MD DISCHARGE MEDS RECONCILED W/ CURRENT OUTPATIENT MED LIST    Medical Decision Making: moderate complexity  Return in about 6 months (around 2023). On this date 2022 I have spent 35 minutes reviewing previous notes, test results and face to face with the patient discussing the diagnosis and importance of compliance with the treatment plan as well as documenting on the day of the visit.        Subjective:      [facility]     Discharge Summary     Patient ID: Serafin Oliveros  :  1970         MRN: [id number] include: has asthma. Treatment modalities that have been used in the past include: dupixent. Follows with derm. Was so bad he didn't leave the house except going to ED for pain    Asthma  There is no chest tightness, cough, difficulty breathing, frequent throat clearing, hemoptysis, hoarse voice, shortness of breath, sputum production or wheezing. This is a chronic problem. The current episode started more than 1 year ago. The problem occurs rarely. The problem has been gradually improving. Pertinent negatives include no appetite change, chest pain, dyspnea on exertion, ear congestion, ear pain, headaches, heartburn, malaise/fatigue, myalgias, nasal congestion, orthopnea, PND, postnasal drip, rhinorrhea, sneezing, sore throat, sweats, trouble swallowing or weight loss. His symptoms are alleviated by beta-agonist. He reports significant improvement on treatment. His past medical history is significant for asthma. Hyperlipidemia  This is a chronic problem. The current episode started more than 1 year ago. The problem is uncontrolled. Recent lipid tests were reviewed and are high. Exacerbating diseases include obesity. He has no history of chronic renal disease, diabetes, hypothyroidism, liver disease or nephrotic syndrome. Pertinent negatives include no chest pain, focal sensory loss, focal weakness, leg pain, myalgias or shortness of breath. Current antihyperlipidemic treatment includes statins. Inpatient course: Discharge summary reviewed- see chart.     Interval history/Current status: stable    Patient Active Problem List   Diagnosis    Chronic eczema    Mild intermittent asthma without complication    Erectile dysfunction    Mixed hyperlipidemia    Scleritis of right eye    Bell's palsy    Left upper lobe pulmonary nodule       Medications listed as ordered at the time of discharge from hospital     Medication List            Accurate as of August 23, 2022  9:42 AM. If you have any questions, ask your nurse or Genitourinary: Negative. Musculoskeletal: Negative. Negative for myalgias. Skin: Negative. Allergic/Immunologic: Negative. Neurological: Negative. Negative for focal weakness and headaches. Hematological: Negative. Psychiatric/Behavioral: Negative. All other systems reviewed and are negative. Objective:    /80 (Site: Left Upper Arm, Position: Sitting, Cuff Size: Small Adult)   Pulse 87   Temp 97.3 °F (36.3 °C) (Temporal)   Wt 175 lb (79.4 kg)   SpO2 99%   BMI 25.11 kg/m²   Physical Exam  Vitals and nursing note reviewed. Constitutional:       General: He is not in acute distress. Appearance: Normal appearance. He is normal weight. He is not ill-appearing, toxic-appearing or diaphoretic. HENT:      Head: Normocephalic and atraumatic. Right Ear: External ear normal.      Left Ear: External ear normal.   Eyes:      General: No scleral icterus. Right eye: No discharge. Left eye: No discharge. Conjunctiva/sclera: Conjunctivae normal.   Cardiovascular:      Rate and Rhythm: Normal rate and regular rhythm. Pulses: Normal pulses. Heart sounds: Normal heart sounds. No murmur heard. No friction rub. No gallop. Pulmonary:      Effort: Pulmonary effort is normal. No respiratory distress. Breath sounds: Normal breath sounds. No stridor. No wheezing, rhonchi or rales. Chest:      Chest wall: No tenderness. Skin:     General: Skin is warm. Coloration: Skin is not jaundiced or pale. Neurological:      Mental Status: He is alert and oriented to person, place, and time. Mental status is at baseline. Psychiatric:         Mood and Affect: Mood normal.         Behavior: Behavior normal.         Thought Content: Thought content normal.         Judgment: Judgment normal.       An electronic signature was used to authenticate this note.   --Denece Figures, DO

## 2022-11-13 DIAGNOSIS — E78.2 MIXED HYPERLIPIDEMIA: ICD-10-CM

## 2022-11-14 NOTE — TELEPHONE ENCOUNTER
Karlene Parish is calling to request a refill on the following medication(s):    Medication Request:  Requested Prescriptions     Pending Prescriptions Disp Refills    atorvastatin (LIPITOR) 40 MG tablet [Pharmacy Med Name: ATORVASTATIN 40 MG TABLET] 30 tablet 5     Sig: TAKE ONE TABLET BY MOUTH DAILY       Last Visit Date (If Applicable):  8/21/8076    Next Visit Date:    Visit date not found

## 2022-11-15 RX ORDER — ATORVASTATIN CALCIUM 40 MG/1
TABLET, FILM COATED ORAL
Qty: 30 TABLET | Refills: 5 | Status: SHIPPED | OUTPATIENT
Start: 2022-11-15

## 2023-04-27 DIAGNOSIS — N52.9 ERECTILE DYSFUNCTION, UNSPECIFIED ERECTILE DYSFUNCTION TYPE: ICD-10-CM

## 2023-04-27 NOTE — TELEPHONE ENCOUNTER
Ozzie Villatoro is calling to request a refill on the following medication(s):    Medication Request:  Requested Prescriptions     Pending Prescriptions Disp Refills    tadalafil (CIALIS) 20 MG tablet [Pharmacy Med Name: TADALAFIL 20 MG TABLET] 6 tablet 1     Sig: TAKE ONE TABLET BY MOUTH AS NEEDED FOR ERECTILE DYSFUNCTION       Last Visit Date (If Applicable):  9/25/8764    Next Visit Date:    Visit date not found

## 2023-04-29 RX ORDER — TADALAFIL 20 MG/1
TABLET ORAL
Qty: 6 TABLET | Refills: 1 | Status: SHIPPED | OUTPATIENT
Start: 2023-04-29

## 2023-07-23 DIAGNOSIS — E78.2 MIXED HYPERLIPIDEMIA: ICD-10-CM

## 2023-07-24 DIAGNOSIS — N52.9 ERECTILE DYSFUNCTION, UNSPECIFIED ERECTILE DYSFUNCTION TYPE: ICD-10-CM

## 2023-07-24 RX ORDER — TADALAFIL 20 MG/1
20 TABLET ORAL PRN
Qty: 6 TABLET | Refills: 1 | Status: SHIPPED | OUTPATIENT
Start: 2023-07-24

## 2023-07-24 RX ORDER — ATORVASTATIN CALCIUM 40 MG/1
TABLET, FILM COATED ORAL
Qty: 30 TABLET | Refills: 0 | Status: SHIPPED | OUTPATIENT
Start: 2023-07-24 | End: 2023-08-09 | Stop reason: SDUPTHER

## 2023-07-24 NOTE — TELEPHONE ENCOUNTER
Avi Luther is calling to request a refill on the following medication(s):    Medication Request:  Requested Prescriptions     Pending Prescriptions Disp Refills    tadalafil (CIALIS) 20 MG tablet 6 tablet 1     Sig: Take 1 tablet by mouth as needed for Erectile Dysfunction       Last Visit Date (If Applicable):  9/85/7452    Next Visit Date:    Visit date not found

## 2023-07-24 NOTE — TELEPHONE ENCOUNTER
[facility] is calling to request a refill on the following medication(s):    Medication Request:  Requested Prescriptions     Pending Prescriptions Disp Refills    atorvastatin (LIPITOR) 40 MG tablet [Pharmacy Med Name: ATORVASTATIN 40 MG TABLET] 30 tablet 5     Sig: TAKE ONE TABLET BY MOUTH DAILY       Last Visit Date (If Applicable):  0/79/6428    Next Visit Date:    Visit date not found

## 2023-08-09 DIAGNOSIS — E78.2 MIXED HYPERLIPIDEMIA: ICD-10-CM

## 2023-08-09 RX ORDER — ATORVASTATIN CALCIUM 40 MG/1
40 TABLET, FILM COATED ORAL DAILY
Qty: 30 TABLET | Refills: 0 | Status: SHIPPED | OUTPATIENT
Start: 2023-08-09

## 2023-08-09 NOTE — TELEPHONE ENCOUNTER
Sol Mcgrath is calling to request a refill on the following medication(s):    Medication Request:  Requested Prescriptions     Pending Prescriptions Disp Refills    atorvastatin (LIPITOR) 40 MG tablet 90 tablet 1     Sig: Take 1 tablet by mouth daily       Last Visit Date (If Applicable):  6/71/6604    Next Visit Date:    Visit date not found

## 2023-10-01 DIAGNOSIS — E78.2 MIXED HYPERLIPIDEMIA: ICD-10-CM

## 2023-10-02 RX ORDER — ATORVASTATIN CALCIUM 40 MG/1
40 TABLET, FILM COATED ORAL DAILY
Qty: 30 TABLET | Refills: 0 | OUTPATIENT
Start: 2023-10-02

## 2023-10-02 NOTE — TELEPHONE ENCOUNTER
Saloni Chung is calling to request a refill on the following medication(s):    Medication Request:  Requested Prescriptions     Pending Prescriptions Disp Refills    atorvastatin (LIPITOR) 40 MG tablet [Pharmacy Med Name: ATORVASTATIN 40 MG TABLET] 30 tablet 0     Sig: TAKE 1 TABLET BY MOUTH DAILY       Last Visit Date (If Applicable):  0/53/8116    Next Visit Date:    Visit date not found

## 2023-10-03 ENCOUNTER — HOSPITAL ENCOUNTER (OUTPATIENT)
Dept: MRI IMAGING | Age: 53
Discharge: HOME | End: 2023-10-05
Payer: COMMERCIAL

## 2023-10-03 DIAGNOSIS — S43.402A SPRAIN OF LEFT SHOULDER, UNSPECIFIED SHOULDER SPRAIN TYPE, INITIAL ENCOUNTER: ICD-10-CM

## 2023-10-03 DIAGNOSIS — S46.012A STRAIN OF TENDON OF LEFT ROTATOR CUFF, INITIAL ENCOUNTER: ICD-10-CM

## 2023-10-03 PROCEDURE — 73221 MRI JOINT UPR EXTREM W/O DYE: CPT

## 2023-11-01 ENCOUNTER — TELEPHONE (OUTPATIENT)
Dept: FAMILY MEDICINE CLINIC | Age: 53
End: 2023-11-01

## 2023-11-01 DIAGNOSIS — E78.2 MIXED HYPERLIPIDEMIA: ICD-10-CM

## 2023-11-01 RX ORDER — ATORVASTATIN CALCIUM 40 MG/1
40 TABLET, FILM COATED ORAL DAILY
Qty: 90 TABLET | Refills: 1 | Status: SHIPPED | OUTPATIENT
Start: 2023-11-01

## 2023-11-01 NOTE — TELEPHONE ENCOUNTER
Patient calling to get a refill on his atorvastatin he is all out as of now he has an  appointment scheduled on 1/19/23 but he a refill before then

## 2024-01-19 ENCOUNTER — OFFICE VISIT (OUTPATIENT)
Dept: FAMILY MEDICINE CLINIC | Age: 54
End: 2024-01-19
Payer: COMMERCIAL

## 2024-01-19 VITALS
OXYGEN SATURATION: 96 % | DIASTOLIC BLOOD PRESSURE: 68 MMHG | HEART RATE: 95 BPM | BODY MASS INDEX: 26.86 KG/M2 | SYSTOLIC BLOOD PRESSURE: 122 MMHG | WEIGHT: 187.2 LBS

## 2024-01-19 DIAGNOSIS — N52.9 ERECTILE DYSFUNCTION, UNSPECIFIED ERECTILE DYSFUNCTION TYPE: ICD-10-CM

## 2024-01-19 DIAGNOSIS — S46.012D TRAUMATIC TEAR OF LEFT ROTATOR CUFF, UNSPECIFIED TEAR EXTENT, SUBSEQUENT ENCOUNTER: ICD-10-CM

## 2024-01-19 DIAGNOSIS — E78.2 MIXED HYPERLIPIDEMIA: ICD-10-CM

## 2024-01-19 DIAGNOSIS — J30.9 ALLERGIC RHINITIS, UNSPECIFIED SEASONALITY, UNSPECIFIED TRIGGER: ICD-10-CM

## 2024-01-19 DIAGNOSIS — Z12.5 PROSTATE CANCER SCREENING: ICD-10-CM

## 2024-01-19 DIAGNOSIS — L30.9 CHRONIC ECZEMA: ICD-10-CM

## 2024-01-19 DIAGNOSIS — J45.20 MILD INTERMITTENT ASTHMA WITHOUT COMPLICATION: Primary | Chronic | ICD-10-CM

## 2024-01-19 DIAGNOSIS — R73.01 IFG (IMPAIRED FASTING GLUCOSE): ICD-10-CM

## 2024-01-19 PROCEDURE — 99214 OFFICE O/P EST MOD 30 MIN: CPT | Performed by: FAMILY MEDICINE

## 2024-01-19 RX ORDER — CHLORAL HYDRATE 500 MG
1000 CAPSULE ORAL DAILY
Qty: 90 CAPSULE | Refills: 1 | Status: SHIPPED | OUTPATIENT
Start: 2024-01-19

## 2024-01-19 RX ORDER — ATORVASTATIN CALCIUM 40 MG/1
40 TABLET, FILM COATED ORAL DAILY
Qty: 90 TABLET | Refills: 1 | Status: SHIPPED | OUTPATIENT
Start: 2024-01-19

## 2024-01-19 RX ORDER — TADALAFIL 20 MG/1
20 TABLET ORAL PRN
Qty: 6 TABLET | Refills: 1 | Status: SHIPPED | OUTPATIENT
Start: 2024-01-19

## 2024-01-19 RX ORDER — CETIRIZINE HYDROCHLORIDE 10 MG/1
10 TABLET ORAL DAILY
Qty: 90 TABLET | Refills: 1 | Status: SHIPPED | OUTPATIENT
Start: 2024-01-19

## 2024-01-19 RX ORDER — ALBUTEROL SULFATE 90 UG/1
AEROSOL, METERED RESPIRATORY (INHALATION)
Qty: 1 EACH | Refills: 1 | Status: SHIPPED | OUTPATIENT
Start: 2024-01-19

## 2024-01-19 SDOH — ECONOMIC STABILITY: FOOD INSECURITY: WITHIN THE PAST 12 MONTHS, YOU WORRIED THAT YOUR FOOD WOULD RUN OUT BEFORE YOU GOT MONEY TO BUY MORE.: NEVER TRUE

## 2024-01-19 SDOH — ECONOMIC STABILITY: FOOD INSECURITY: WITHIN THE PAST 12 MONTHS, THE FOOD YOU BOUGHT JUST DIDN'T LAST AND YOU DIDN'T HAVE MONEY TO GET MORE.: NEVER TRUE

## 2024-01-19 SDOH — ECONOMIC STABILITY: HOUSING INSECURITY
IN THE LAST 12 MONTHS, WAS THERE A TIME WHEN YOU DID NOT HAVE A STEADY PLACE TO SLEEP OR SLEPT IN A SHELTER (INCLUDING NOW)?: NO

## 2024-01-19 SDOH — ECONOMIC STABILITY: INCOME INSECURITY: HOW HARD IS IT FOR YOU TO PAY FOR THE VERY BASICS LIKE FOOD, HOUSING, MEDICAL CARE, AND HEATING?: NOT HARD AT ALL

## 2024-01-19 ASSESSMENT — ENCOUNTER SYMPTOMS
DIFFICULTY BREATHING: 0
SPUTUM PRODUCTION: 0
HEARTBURN: 0
RHINORRHEA: 0
FREQUENT THROAT CLEARING: 0
RESPIRATORY NEGATIVE: 1
COUGH: 0
SHORTNESS OF BREATH: 0
WHEEZING: 0
HOARSE VOICE: 0
EYES NEGATIVE: 1
SORE THROAT: 0
HEMOPTYSIS: 0
GASTROINTESTINAL NEGATIVE: 1
ALLERGIC/IMMUNOLOGIC NEGATIVE: 1
TROUBLE SWALLOWING: 0
CHEST TIGHTNESS: 0

## 2024-01-19 ASSESSMENT — PATIENT HEALTH QUESTIONNAIRE - PHQ9
SUM OF ALL RESPONSES TO PHQ QUESTIONS 1-9: 0
SUM OF ALL RESPONSES TO PHQ9 QUESTIONS 1 & 2: 0
SUM OF ALL RESPONSES TO PHQ QUESTIONS 1-9: 0
SUM OF ALL RESPONSES TO PHQ QUESTIONS 1-9: 0
2. FEELING DOWN, DEPRESSED OR HOPELESS: 0
1. LITTLE INTEREST OR PLEASURE IN DOING THINGS: 0
SUM OF ALL RESPONSES TO PHQ QUESTIONS 1-9: 0

## 2024-01-19 NOTE — ASSESSMENT & PLAN NOTE
Well-controlled, continue current medications, continue current treatment plan, and medication adherence emphasized

## 2024-01-19 NOTE — PROGRESS NOTES
[facility] Progress Note    Date:1/19/2024         Patient Name:Jass Fox     YOB: 1970     Age:53 y.o.    Assessment/Plan        Problem List Items Addressed This Visit          Respiratory    Mild intermittent asthma without complication - Primary (Chronic)      Well-controlled, continue current medications, continue current treatment plan, and medication adherence emphasized         Relevant Medications    albuterol sulfate HFA (PROAIR HFA) 108 (90 Base) MCG/ACT inhaler    Other Relevant Orders    CBC with Auto Differential       Musculoskeletal and Integument    Chronic eczema      At goal, continue current medications and continue current treatment plan         Relevant Medications    cetirizine (ZYRTEC) 10 MG tablet       Other    Erectile dysfunction      At goal, continue current medications and continue current treatment plan         Relevant Medications    tadalafil (CIALIS) 20 MG tablet    Mixed hyperlipidemia      Unclear control, continue current plan pending work up below         Relevant Medications    atorvastatin (LIPITOR) 40 MG tablet    tadalafil (CIALIS) 20 MG tablet    Omega-3 Fatty Acids (FISH OIL) 1000 MG capsule    Other Relevant Orders    Comprehensive Metabolic Panel    Lipid Panel     Other Visit Diagnoses       Allergic rhinitis, unspecified seasonality, unspecified trigger        Relevant Medications    albuterol sulfate HFA (PROAIR HFA) 108 (90 Base) MCG/ACT inhaler    cetirizine (ZYRTEC) 10 MG tablet    IFG (impaired fasting glucose)        Relevant Orders    Hemoglobin A1C    Prostate cancer screening        Relevant Orders    PSA Screening    Traumatic tear of left rotator cuff, unspecified tear extent, subsequent encounter        Happened at work so going through [employer]  Seeing Dr. Barba about surgery - has had MRI             Return in about 6 months (around 7/19/2024).    Electronically signed by Manjeet Lewis DO on 1/19/24       Total time spent

## 2024-01-19 NOTE — PATIENT INSTRUCTIONS
with your baked potato instead of sour cream, butter, cheese, or morrison.  Where can you learn more?  Go to [email or web address]://[email or web address].net/patientEd and enter W495 to learn more about \"Learning About Low-Fat Eating.\"  Current as of: February 26, 2023               Content Version: 13.9  © 1671-5040 [employer].   Care instructions adapted under license by [employer]. If you have questions about a medical condition or this instruction, always ask your healthcare professional. [employer] disclaims any warranty or liability for your use of this information.

## 2024-01-24 ENCOUNTER — TELEPHONE (OUTPATIENT)
Dept: FAMILY MEDICINE CLINIC | Age: 54
End: 2024-01-24

## 2024-01-24 DIAGNOSIS — Z12.12 ENCOUNTER FOR COLORECTAL CANCER SCREENING: Primary | ICD-10-CM

## 2024-01-24 DIAGNOSIS — Z12.11 ENCOUNTER FOR COLORECTAL CANCER SCREENING: Primary | ICD-10-CM

## 2024-01-26 ENCOUNTER — TELEPHONE (OUTPATIENT)
Dept: GASTROENTEROLOGY | Age: 54
End: 2024-01-26

## 2024-01-26 DIAGNOSIS — Z12.11 COLON CANCER SCREENING: Primary | ICD-10-CM

## 2024-01-26 NOTE — TELEPHONE ENCOUNTER
Writer returned pt call, writer notes pt is not established with any provider in this practice, per colon screen questionnaire pt can be scheduled for colon.    STA Anthony Friday 2/9/24@7:45am colon screen(new) miralax/dulc.    Reviewed bowel prep instructions with patient over phone and mailed to home address.

## 2024-01-26 NOTE — TELEPHONE ENCOUNTER
Writer rec'd referral from PCP to schedule pt for colon, writer called pt to schedule, pt did not answer, writer YURI requesting return call.     1st attempt; called and LVM for patient regarding colon screen referral.     2nd attempt; mailed colon screen letter to patient's home address.

## 2024-01-26 NOTE — TELEPHONE ENCOUNTER
Patient contacted office asking for return phone call from Michaela to schedule procedure with Dr. Cruz-kobi.

## 2024-01-29 RX ORDER — POLYETHYLENE GLYCOL 3350 17 G/17G
POWDER, FOR SOLUTION ORAL
Qty: 238 G | Refills: 0 | Status: SHIPPED | OUTPATIENT
Start: 2024-01-29

## 2024-01-29 RX ORDER — BISACODYL 5 MG/1
TABLET, DELAYED RELEASE ORAL
Qty: 4 TABLET | Refills: 0 | Status: SHIPPED | OUTPATIENT
Start: 2024-01-29

## 2024-02-09 ENCOUNTER — HOSPITAL ENCOUNTER (OUTPATIENT)
Age: 54
Setting detail: OUTPATIENT SURGERY
Discharge: HOME | End: 2024-02-09
Attending: INTERNAL MEDICINE | Admitting: INTERNAL MEDICINE
Payer: COMMERCIAL

## 2024-02-09 ENCOUNTER — ANESTHESIA (OUTPATIENT)
Dept: OPERATING ROOM | Age: 54
End: 2024-02-09
Payer: COMMERCIAL

## 2024-02-09 ENCOUNTER — ANESTHESIA EVENT (OUTPATIENT)
Dept: OPERATING ROOM | Age: 54
End: 2024-02-09
Payer: COMMERCIAL

## 2024-02-09 VITALS
RESPIRATION RATE: 15 BRPM | HEIGHT: 70 IN | BODY MASS INDEX: 26.86 KG/M2 | TEMPERATURE: 98.1 F | OXYGEN SATURATION: 96 % | SYSTOLIC BLOOD PRESSURE: 128 MMHG | DIASTOLIC BLOOD PRESSURE: 84 MMHG | HEART RATE: 75 BPM

## 2024-02-09 DIAGNOSIS — Z12.11 SCREEN FOR COLON CANCER: ICD-10-CM

## 2024-02-09 PROCEDURE — 3700000000 HC ANESTHESIA ATTENDED CARE: Performed by: INTERNAL MEDICINE

## 2024-02-09 PROCEDURE — 2580000003 HC RX 258: Performed by: ANESTHESIOLOGY

## 2024-02-09 PROCEDURE — 2500000003 HC RX 250 WO HCPCS: Performed by: SPECIALIST

## 2024-02-09 PROCEDURE — 88305 TISSUE EXAM BY PATHOLOGIST: CPT

## 2024-02-09 PROCEDURE — 7100000011 HC PHASE II RECOVERY - ADDTL 15 MIN: Performed by: INTERNAL MEDICINE

## 2024-02-09 PROCEDURE — 3609010600 HC COLONOSCOPY POLYPECTOMY SNARE/COLD BIOPSY: Performed by: INTERNAL MEDICINE

## 2024-02-09 PROCEDURE — 7100000010 HC PHASE II RECOVERY - FIRST 15 MIN: Performed by: INTERNAL MEDICINE

## 2024-02-09 PROCEDURE — 2709999900 HC NON-CHARGEABLE SUPPLY: Performed by: INTERNAL MEDICINE

## 2024-02-09 PROCEDURE — 6360000002 HC RX W HCPCS: Performed by: SPECIALIST

## 2024-02-09 PROCEDURE — 3700000001 HC ADD 15 MINUTES (ANESTHESIA): Performed by: INTERNAL MEDICINE

## 2024-02-09 PROCEDURE — 2580000003 HC RX 258: Performed by: SPECIALIST

## 2024-02-09 PROCEDURE — 45385 COLONOSCOPY W/LESION REMOVAL: CPT | Performed by: INTERNAL MEDICINE

## 2024-02-09 RX ORDER — SODIUM CHLORIDE, SODIUM LACTATE, POTASSIUM CHLORIDE, CALCIUM CHLORIDE 600; 310; 30; 20 MG/100ML; MG/100ML; MG/100ML; MG/100ML
INJECTION, SOLUTION INTRAVENOUS CONTINUOUS
Status: DISCONTINUED | OUTPATIENT
Start: 2024-02-09 | End: 2024-02-09 | Stop reason: HOSPADM

## 2024-02-09 RX ORDER — SODIUM CHLORIDE 9 MG/ML
INJECTION, SOLUTION INTRAVENOUS CONTINUOUS
Status: DISCONTINUED | OUTPATIENT
Start: 2024-02-09 | End: 2024-02-09 | Stop reason: HOSPADM

## 2024-02-09 RX ORDER — LIDOCAINE HYDROCHLORIDE 10 MG/ML
1 INJECTION, SOLUTION EPIDURAL; INFILTRATION; INTRACAUDAL; PERINEURAL
Status: DISCONTINUED | OUTPATIENT
Start: 2024-02-10 | End: 2024-02-09 | Stop reason: HOSPADM

## 2024-02-09 RX ORDER — PROPOFOL 10 MG/ML
INJECTION, EMULSION INTRAVENOUS PRN
Status: DISCONTINUED | OUTPATIENT
Start: 2024-02-09 | End: 2024-02-09 | Stop reason: SDUPTHER

## 2024-02-09 RX ORDER — SODIUM CHLORIDE, SODIUM LACTATE, POTASSIUM CHLORIDE, CALCIUM CHLORIDE 600; 310; 30; 20 MG/100ML; MG/100ML; MG/100ML; MG/100ML
INJECTION, SOLUTION INTRAVENOUS CONTINUOUS PRN
Status: DISCONTINUED | OUTPATIENT
Start: 2024-02-09 | End: 2024-02-09 | Stop reason: SDUPTHER

## 2024-02-09 RX ORDER — LIDOCAINE HYDROCHLORIDE 10 MG/ML
INJECTION, SOLUTION EPIDURAL; INFILTRATION; INTRACAUDAL; PERINEURAL PRN
Status: DISCONTINUED | OUTPATIENT
Start: 2024-02-09 | End: 2024-02-09 | Stop reason: SDUPTHER

## 2024-02-09 RX ADMIN — SODIUM CHLORIDE, POTASSIUM CHLORIDE, SODIUM LACTATE AND CALCIUM CHLORIDE: 600; 310; 30; 20 INJECTION, SOLUTION INTRAVENOUS at 07:05

## 2024-02-09 RX ADMIN — LIDOCAINE HYDROCHLORIDE 50 MG: 10 INJECTION, SOLUTION EPIDURAL; INFILTRATION; INTRACAUDAL; PERINEURAL at 07:37

## 2024-02-09 RX ADMIN — PROPOFOL 100 MG: 10 INJECTION, EMULSION INTRAVENOUS at 07:37

## 2024-02-09 RX ADMIN — SODIUM CHLORIDE, POTASSIUM CHLORIDE, SODIUM LACTATE AND CALCIUM CHLORIDE: 600; 310; 30; 20 INJECTION, SOLUTION INTRAVENOUS at 07:01

## 2024-02-09 RX ADMIN — PROPOFOL 60 MG: 10 INJECTION, EMULSION INTRAVENOUS at 07:44

## 2024-02-09 RX ADMIN — PROPOFOL 120 MCG/KG/MIN: 10 INJECTION, EMULSION INTRAVENOUS at 07:43

## 2024-02-09 ASSESSMENT — PAIN DESCRIPTION - DESCRIPTORS: DESCRIPTORS: ACHING

## 2024-02-09 ASSESSMENT — ENCOUNTER SYMPTOMS: SHORTNESS OF BREATH: 0

## 2024-02-09 ASSESSMENT — PAIN - FUNCTIONAL ASSESSMENT
PAIN_FUNCTIONAL_ASSESSMENT: 0-10
PAIN_FUNCTIONAL_ASSESSMENT: FACE, LEGS, ACTIVITY, CRY, AND CONSOLABILITY (FLACC)

## 2024-02-09 NOTE — OP NOTE
PROCEDURE NOTE    DATE OF PROCEDURE: 2/9/2024    SURGEON: Radha Cruz MD  Facility : [facility]  ASSISTANT: None  Anesthesia: MAC  PREOPERATIVE DIAGNOSIS:   Screening    POSTOPERATIVE DIAGNOSIS: as described below    OPERATION: Total colonoscopy     ANESTHESIA: Moderate Sedation    ESTIMATED BLOOD LOSS: less than 50     COMPLICATIONS: None.     SPECIMENS:  Was Obtained:     Multiple sessile polyps in the sigmoid the larger 1 was 1 cm were removed with cold snare        HISTORY: The patient is a 53 y.o. year old male with history of above preop diagnosis.  I recommended colonoscopy with possible biopsy or polypectomy and I explained the risk, benefits, expected outcome, and alternatives to the procedure.  Risks included but are not limited to bleeding, infection, respiratory distress, hypotension, and perforation of the colon and possibility of missing a lesion.  The patient understands and is in agreement.        The patient was counseled at length about the risks of cleve Covid-19 during their perioperative period and any recovery window from their procedure.  The patient was made aware that cleve Covid-19  may worsen their prognosis for recovering from their procedure  and lend to a higher morbidity and/or mortality risk.  All material risks, benefits, and reasonable alternatives including postponing the procedure were discussed. The patient does wish to proceed with the procedure at this time.       PROCEDURE: The patient was given IV conscious sedation.  The patient's SPO2 remained above 90% throughout the procedure.     The colonoscope was inserted per rectum and advanced under direct vision to the cecum without difficulty.      Post sedation note :The patient's SPO2 remained above 90% throughout the procedure.the vital signs remained stable , and no immediate complication form the procedure noted, patient will be ready for d/c when criteria is met .        The prep was fair.

## 2024-02-09 NOTE — ANESTHESIA PRE PROCEDURE
• Mild intermittent asthma without complication J45.20   • Erectile dysfunction N52.9   • Mixed hyperlipidemia E78.2   • Scleritis of right eye H15.001   • Bell's palsy G51.0   • Left upper lobe pulmonary nodule R91.1       Past Medical History:        Diagnosis Date   • Asthma    • Eczema    • Hyperlipidemia        Past Surgical History:        Procedure Laterality Date   • SHOULDER SURGERY Left     rotator cuff       Social History:    Social History     Tobacco Use   • Smoking status: Former     Current packs/day: 0.00     Average packs/day: 1 pack/day for 15.0 years (15.0 ttl pk-yrs)     Types: Cigarettes, Cigars     Start date: 10/10/2002     Quit date: 10/10/2017     Years since quittin.3   • Smokeless tobacco: Never   Substance Use Topics   • Alcohol use: Yes     Alcohol/week: 4.2 standard drinks of alcohol     Types: 5 Standard drinks or equivalent per week     Comment: socially                                Counseling given: Not Answered      Vital Signs (Current):   Vitals:    24 0630 24 0639   BP: 128/77    Pulse: 90    Resp: 16    Temp: 97.5 °F (36.4 °C)    SpO2: 94%    Height:  1.778 m (5' 10\")                                              BP Readings from Last 3 Encounters:   24 128/77   24 122/68   22 130/80       NPO Status: Time of last liquid consumption: 2200                        Time of last solid consumption: 1700                        Date of last liquid consumption: 24                        Date of last solid food consumption: 24    BMI:   Wt Readings from Last 3 Encounters:   24 84.9 kg (187 lb 3.2 oz)   22 79.4 kg (175 lb)   22 78.5 kg (173 lb)     Body mass index is 26.86 kg/m².    CBC:   Lab Results   Component Value Date/Time    WBC 4.3 2022 05:33 AM    RBC 4.68 2022 05:33 AM    HGB 14.6 2022 05:33 AM    HCT 45.1 2022 05:33 AM    MCV 96.4 2022 05:33 AM    RDW 13.7 2022 05:33 AM    PLT

## 2024-02-09 NOTE — H&P
MULTIVITAMIN-MINERALS) tablet Take 1 tablet by mouth daily       No current facility-administered medications for this visit.       Past History    Past Medical History:   has a past medical history of Asthma and Eczema.    Social History:   reports that he quit smoking about 6 years ago. His smoking use included cigarettes and cigars. He started smoking about 21 years ago. He has a 15.0 pack-year smoking history. He has never used smokeless tobacco. He reports current alcohol use of about 4.2 standard drinks of alcohol per week. He reports that he does not use drugs.     Family History:   Family History   Problem Relation Age of Onset    Kidney Disease Mother     Hypertension Mother        Surgical History: History reviewed. No pertinent surgical history.     Physical Examination      Vitals:  /68   Pulse 95   Wt 84.9 kg (187 lb 3.2 oz)   SpO2 96%   BMI 26.86 kg/m²     Physical Exam  Vitals and nursing note reviewed.   Constitutional:       General: He is not in acute distress.     Appearance: Normal appearance. He is overweight. He is not ill-appearing, toxic-appearing or diaphoretic.   HENT:      Head: Normocephalic and atraumatic.      Right Ear: External ear normal.      Left Ear: External ear normal.   Eyes:      General: No scleral icterus.        Right eye: No discharge.         Left eye: No discharge.      Conjunctiva/sclera: Conjunctivae normal.   Cardiovascular:      Rate and Rhythm: Normal rate and regular rhythm.      Pulses: Normal pulses.      Heart sounds: Normal heart sounds. No murmur heard.     No friction rub. No gallop.   Pulmonary:      Effort: Pulmonary effort is normal. No respiratory distress.      Breath sounds: Normal breath sounds. No stridor. No wheezing, rhonchi or rales.   Chest:      Chest wall: No tenderness.   Skin:     General: Skin is warm.      Coloration: Skin is not jaundiced or pale.   Neurological:      Mental Status: He is alert and oriented to person, place, and  time. Mental status is at baseline.   Psychiatric:         Mood and Affect: Mood normal.         Behavior: Behavior normal.         Thought Content: Thought content normal.         Judgment: Judgment normal.         Labs/Imaging/Diagnostics   Labs:  Hemoglobin A1C   Date Value Ref Range Status   06/28/2022 5.2 4.0 - 6.0 % Final       Imaging Last 24 Hours:  MRI SHOULDER LEFT WO CONTRAST  Narrative: EXAMINATION:  MRI OF THE LEFT SHOULDER WITHOUT CONTRAST   10/3/2023 1:55 pm    TECHNIQUE:  Multiplanar multisequence MRI of the left shoulder was performed without the  administration of intravenous contrast.    COMPARISON:  None.    HISTORY:  ORDERING SYSTEM PROVIDED HISTORY: Sprain of left shoulder, unspecified  shoulder sprain type, initial encounter    52-year-old male with left shoulder sprain.    FINDINGS:  ROTATOR CUFF: Subacromial subdeltoid bursa contiguous with the glenohumeral  joint.    Complete retracted full-thickness tear of supraspinatus and infraspinatus  with retraction of the torn fibers from the footplate measuring up to 3.4 cm  on image 11, series 4.    Mild subscapularis tendinosis.    Teres minor muscle/tendon appears grossly intact without evidence of tearing.    Mild fatty degeneration of infraspinatus.    BICEPS TENDON: Intact vertical and horizontal portions of the long head of  the biceps tendon.    LABRUM: Mild diffuse labral degeneration.  No paralabral cyst formation.    GLENOHUMERAL JOINT: Mild glenohumeral chondromalacia.  Inferior glenohumeral  ligament appears intact.  No sizable glenohumeral joint effusion.    AC JOINT AND ACROMIOCLAVICULAR ARCH: Mild degenerative change of the left AC  joint.  Type 2 acromion.    BONE MARROW: Signal changes at the superior humeral head likely related to  prior trauma, surgery, or AVN.  Subcortical cystic changes along the  anterolateral humeral head.  No acute fracture or dislocation.    OUTLET SPACES: Suprascapular notch and quadrilateral space

## 2024-02-09 NOTE — DISCHARGE INSTRUCTIONS
Colonoscopy: What to Expect at Home  Your Recovery  Your doctor will talk to you about when you will need your next colonoscopy. The results of your test and your risk for colorectal cancer will help your doctor decide how often you need to be checked.  After the test, you may be bloated or have gas pains. You may need to pass gas. If a biopsy was done or a polyp was removed, you may have streaks of blood in your stool (feces) for a few days.    How can you care for yourself at home?  Activity  Rest as much as you need to after you go home.  You should be able to go back to your usual activities the day after the test.  Diet  Follow your doctor’s directions for eating.  Drink plenty of fluids (unless your doctor has told you not to) to replace the fluids that were lost during the colon prep.  Medicines  If polyps were removed or a biopsy was done during the test, your doctor may tell you not to take aspirin or other anti-inflammatory medicines, such as ibuprofen (Advil, Motrin) and naproxen (Aleve), for a few days.  Follow-up care is a key part of your treatment and safety. Be sure to make and go to all appointments, and call your doctor if you are having problems.  When should you call for help?  Call 911 anytime you think you may need emergency care. For example, call if:  You passed out (lost consciousness).  You pass maroon or bloody stools.  You have severe belly pain.  Call your doctor now or seek immediate medical care if:  Your stools are black and tarlike.  Your stools have streaks of blood, but you did not have a biopsy or any polyps removed.  You have belly pain, or your belly is swollen and firm.  You vomit.  You have a fever.  You are very dizzy.  Watch closely for changes in your health, and be sure to contact your doctor if you have any problems.   Where can you learn more?   Go to [email or web address] and sign in to your [employer] account. Enter E264 in the Search [employer]

## 2024-02-13 LAB — SURGICAL PATHOLOGY REPORT: NORMAL

## 2024-02-19 DIAGNOSIS — Z12.11 COLON CANCER SCREENING: ICD-10-CM

## 2024-06-18 ENCOUNTER — OFFICE VISIT (OUTPATIENT)
Dept: FAMILY MEDICINE CLINIC | Age: 54
End: 2024-06-18
Payer: COMMERCIAL

## 2024-06-18 VITALS
OXYGEN SATURATION: 98 % | SYSTOLIC BLOOD PRESSURE: 126 MMHG | HEART RATE: 88 BPM | TEMPERATURE: 97.1 F | DIASTOLIC BLOOD PRESSURE: 83 MMHG

## 2024-06-18 DIAGNOSIS — H10.33 ACUTE BACTERIAL CONJUNCTIVITIS OF BOTH EYES: Primary | ICD-10-CM

## 2024-06-18 PROCEDURE — 99213 OFFICE O/P EST LOW 20 MIN: CPT | Performed by: PHYSICIAN ASSISTANT

## 2024-06-18 RX ORDER — POLYMYXIN B SULFATE AND TRIMETHOPRIM 1; 10000 MG/ML; [USP'U]/ML
1 SOLUTION OPHTHALMIC EVERY 4 HOURS
Qty: 1 EACH | Refills: 0 | Status: SHIPPED | OUTPATIENT
Start: 2024-06-18 | End: 2024-06-28

## 2024-06-18 RX ORDER — ERYTHROMYCIN 5 MG/G
OINTMENT OPHTHALMIC
Qty: 1 EACH | Refills: 0 | Status: SHIPPED | OUTPATIENT
Start: 2024-06-18 | End: 2024-06-28

## 2024-06-18 RX ORDER — PREDNISONE 20 MG/1
20 TABLET ORAL 2 TIMES DAILY
Qty: 10 TABLET | Refills: 0 | Status: SHIPPED | OUTPATIENT
Start: 2024-06-18 | End: 2024-06-23

## 2024-06-18 ASSESSMENT — ENCOUNTER SYMPTOMS
EYE PAIN: 1
EYE REDNESS: 1
GASTROINTESTINAL NEGATIVE: 1
RESPIRATORY NEGATIVE: 1
EYE DISCHARGE: 1

## 2024-06-18 NOTE — PROGRESS NOTES
[facility]  [address]   [address]  [facility] [address]  Phone: [phone]  Fax: [phone]       [facility] WALK - IN    Pt Name: Jass Fox  MRN: [id number]  Birthdate 1970  Date of evaluation: 6/18/2024  Provider: Zara Martinez PA-C     CHIEF COMPLAINT       Chief Complaint   Patient presents with    Burning Eyes     Onset for 2 weeks with teary, and drainage .            HISTORY OF PRESENT ILLNESS  (Location/Symptom, Timing/Onset, Context/Setting, Quality, Duration, Modifying Factors, Severity.)   Jass Fox is a 53 y.o. Black /  [2] male who presents to the office for evaluation of      Conjunctivitis   The current episode started more than 1 week ago. The problem is moderate. Nothing relieves the symptoms. Associated symptoms include eye discharge, eye pain and eye redness. Pertinent negatives include no fever. The eye pain is moderate. Both eyes are affected. The eye pain is not associated with movement. The eyelid exhibits no abnormality.       Nursing Notes were reviewed.    REVIEW OF SYSTEMS    (2-9 systems for level 4, 10 or more for level 5)     Review of Systems   Constitutional:  Negative for fever.   HENT: Negative.     Eyes:  Positive for pain, discharge and redness.   Respiratory: Negative.     Cardiovascular: Negative.    Gastrointestinal: Negative.    Genitourinary: Negative.    Musculoskeletal: Negative.          Except as noted above the remainder of the review of systems was reviewed andnegative.       PAST MEDICAL HISTORY   History reviewed.    Past Medical History:   Diagnosis Date    Asthma     Eczema     Hyperlipidemia          SURGICAL HISTORY     History reviewed.    Past Surgical History:   Procedure Laterality Date    COLONOSCOPY N/A 2/9/2024    COLONOSCOPY POLYPECTOMY SNARE/COLD BIOPSY performed by Radha Cruz MD at [facility] OR    SHOULDER SURGERY Left     rotator cuff         CURRENT MEDICATIONS       Current

## 2024-09-24 ENCOUNTER — OFFICE VISIT (OUTPATIENT)
Dept: FAMILY MEDICINE CLINIC | Age: 54
End: 2024-09-24

## 2024-09-24 VITALS
BODY MASS INDEX: 26.83 KG/M2 | WEIGHT: 187 LBS | HEART RATE: 101 BPM | DIASTOLIC BLOOD PRESSURE: 72 MMHG | SYSTOLIC BLOOD PRESSURE: 118 MMHG | OXYGEN SATURATION: 95 %

## 2024-09-24 DIAGNOSIS — Z12.5 PROSTATE CANCER SCREENING: ICD-10-CM

## 2024-09-24 DIAGNOSIS — E78.2 MIXED HYPERLIPIDEMIA: ICD-10-CM

## 2024-09-24 DIAGNOSIS — R73.01 IFG (IMPAIRED FASTING GLUCOSE): ICD-10-CM

## 2024-09-24 DIAGNOSIS — B35.1 ONYCHOMYCOSIS: Primary | ICD-10-CM

## 2024-09-24 DIAGNOSIS — N52.9 ERECTILE DYSFUNCTION, UNSPECIFIED ERECTILE DYSFUNCTION TYPE: ICD-10-CM

## 2024-09-24 DIAGNOSIS — J30.9 ALLERGIC RHINITIS, UNSPECIFIED SEASONALITY, UNSPECIFIED TRIGGER: ICD-10-CM

## 2024-09-24 DIAGNOSIS — J45.20 MILD INTERMITTENT ASTHMA WITHOUT COMPLICATION: Chronic | ICD-10-CM

## 2024-09-24 PROCEDURE — 99214 OFFICE O/P EST MOD 30 MIN: CPT | Performed by: FAMILY MEDICINE

## 2024-09-24 RX ORDER — CHLORAL HYDRATE 500 MG
1000 CAPSULE ORAL DAILY
Qty: 90 CAPSULE | Refills: 1 | Status: SHIPPED | OUTPATIENT
Start: 2024-09-24

## 2024-09-24 RX ORDER — TADALAFIL 20 MG/1
20 TABLET ORAL PRN
Qty: 30 TABLET | Refills: 1 | Status: SHIPPED | OUTPATIENT
Start: 2024-09-24

## 2024-09-24 RX ORDER — ATORVASTATIN CALCIUM 40 MG/1
40 TABLET, FILM COATED ORAL DAILY
Qty: 90 TABLET | Refills: 1 | Status: SHIPPED | OUTPATIENT
Start: 2024-09-24

## 2024-09-24 RX ORDER — ALBUTEROL SULFATE 90 UG/1
INHALANT RESPIRATORY (INHALATION)
Qty: 1 EACH | Refills: 1 | Status: SHIPPED | OUTPATIENT
Start: 2024-09-24

## 2024-09-24 RX ORDER — CETIRIZINE HYDROCHLORIDE 10 MG/1
10 TABLET ORAL DAILY
Qty: 90 TABLET | Refills: 1 | Status: SHIPPED | OUTPATIENT
Start: 2024-09-24

## 2024-09-24 ASSESSMENT — ENCOUNTER SYMPTOMS
SORE THROAT: 0
TROUBLE SWALLOWING: 0
ALLERGIC/IMMUNOLOGIC NEGATIVE: 1
COUGH: 0
DIFFICULTY BREATHING: 0
CHEST TIGHTNESS: 0
HOARSE VOICE: 0
RHINORRHEA: 0
FREQUENT THROAT CLEARING: 0
EYES NEGATIVE: 1
SPUTUM PRODUCTION: 0
HEARTBURN: 0
WHEEZING: 0
RESPIRATORY NEGATIVE: 1
HEMOPTYSIS: 0
GASTROINTESTINAL NEGATIVE: 1
SHORTNESS OF BREATH: 0

## 2024-10-07 ENCOUNTER — HOSPITAL ENCOUNTER (OUTPATIENT)
Age: 54
Discharge: HOME | End: 2024-10-07

## 2024-10-07 DIAGNOSIS — E78.2 MIXED HYPERLIPIDEMIA: ICD-10-CM

## 2024-10-07 DIAGNOSIS — J45.20 MILD INTERMITTENT ASTHMA WITHOUT COMPLICATION: Chronic | ICD-10-CM

## 2024-10-07 DIAGNOSIS — Z12.5 PROSTATE CANCER SCREENING: ICD-10-CM

## 2024-10-07 DIAGNOSIS — R73.01 IFG (IMPAIRED FASTING GLUCOSE): ICD-10-CM

## 2024-10-07 LAB
ALBUMIN SERPL-MCNC: 4.8 G/DL (ref 3.5–5.2)
ALBUMIN/GLOB SERPL: 2 {RATIO} (ref 1–2.5)
ALP SERPL-CCNC: 85 U/L (ref 40–129)
ALT SERPL-CCNC: 28 U/L (ref 10–50)
ANION GAP SERPL CALCULATED.3IONS-SCNC: 9 MMOL/L (ref 9–16)
AST SERPL-CCNC: 28 U/L (ref 10–50)
BASOPHILS # BLD: 0.05 K/UL (ref 0–0.2)
BASOPHILS NFR BLD: 1 % (ref 0–2)
BILIRUB SERPL-MCNC: 0.4 MG/DL (ref 0–1.2)
BUN SERPL-MCNC: 9 MG/DL (ref 6–20)
CALCIUM SERPL-MCNC: 9.4 MG/DL (ref 8.6–10.4)
CHLORIDE SERPL-SCNC: 103 MMOL/L (ref 98–107)
CHOLEST SERPL-MCNC: 236 MG/DL (ref 0–199)
CHOLESTEROL/HDL RATIO: 5
CO2 SERPL-SCNC: 28 MMOL/L (ref 20–31)
CREAT SERPL-MCNC: 0.9 MG/DL (ref 0.7–1.2)
EOSINOPHIL # BLD: 0.3 K/UL (ref 0–0.44)
EOSINOPHILS RELATIVE PERCENT: 9 % (ref 1–4)
ERYTHROCYTE [DISTWIDTH] IN BLOOD BY AUTOMATED COUNT: 13.2 % (ref 11.8–14.4)
EST. AVERAGE GLUCOSE BLD GHB EST-MCNC: 114 MG/DL
GFR, ESTIMATED: >90 ML/MIN/1.73M2
GLUCOSE SERPL-MCNC: 107 MG/DL (ref 74–99)
HBA1C MFR BLD: 5.6 % (ref 4–6)
HCT VFR BLD AUTO: 45.9 % (ref 40.7–50.3)
HDLC SERPL-MCNC: 49 MG/DL
HGB BLD-MCNC: 14.8 G/DL (ref 13–17)
IMM GRANULOCYTES # BLD AUTO: <0.03 K/UL (ref 0–0.3)
IMM GRANULOCYTES NFR BLD: 0 %
LDLC SERPL CALC-MCNC: 134 MG/DL (ref 0–100)
LYMPHOCYTES NFR BLD: 1.07 K/UL (ref 1.1–3.7)
LYMPHOCYTES RELATIVE PERCENT: 31 % (ref 24–43)
MCH RBC QN AUTO: 30.1 PG (ref 25.2–33.5)
MCHC RBC AUTO-ENTMCNC: 32.2 G/DL (ref 28.4–34.8)
MCV RBC AUTO: 93.5 FL (ref 82.6–102.9)
MONOCYTES NFR BLD: 0.36 K/UL (ref 0.1–1.2)
MONOCYTES NFR BLD: 10 % (ref 3–12)
NEUTROPHILS NFR BLD: 49 % (ref 36–65)
NEUTS SEG NFR BLD: 1.71 K/UL (ref 1.5–8.1)
NRBC BLD-RTO: 0 PER 100 WBC
PLATELET # BLD AUTO: 249 K/UL (ref 138–453)
PMV BLD AUTO: 10.2 FL (ref 8.1–13.5)
POTASSIUM SERPL-SCNC: 4 MMOL/L (ref 3.7–5.3)
PROT SERPL-MCNC: 7.6 G/DL (ref 6.6–8.7)
PSA SERPL-MCNC: 1.1 NG/ML (ref 0–4)
RBC # BLD AUTO: 4.91 M/UL (ref 4.21–5.77)
SODIUM SERPL-SCNC: 140 MMOL/L (ref 136–145)
TRIGL SERPL-MCNC: 270 MG/DL
VLDLC SERPL CALC-MCNC: 54 MG/DL
WBC OTHER # BLD: 3.5 K/UL (ref 3.5–11.3)

## 2024-10-07 PROCEDURE — 80061 LIPID PANEL: CPT

## 2024-10-07 PROCEDURE — 80053 COMPREHEN METABOLIC PANEL: CPT

## 2024-10-07 PROCEDURE — G0103 PSA SCREENING: HCPCS

## 2024-10-07 PROCEDURE — 85025 COMPLETE CBC W/AUTO DIFF WBC: CPT

## 2024-10-07 PROCEDURE — 83036 HEMOGLOBIN GLYCOSYLATED A1C: CPT

## 2024-10-07 PROCEDURE — 36415 COLL VENOUS BLD VENIPUNCTURE: CPT

## 2024-10-11 NOTE — PROGRESS NOTES
Patient instructed to remove shoes and socks and instructed to sit in exam chair.  Current PCP is Manjeet Lewis DO and date of last visit was 09/24/2024.   Do you have a follow up visit scheduled?  No   If yes, the date is unknown

## 2024-10-14 ENCOUNTER — OFFICE VISIT (OUTPATIENT)
Dept: PODIATRY | Age: 54
End: 2024-10-14

## 2024-10-14 VITALS
WEIGHT: 187 LBS | SYSTOLIC BLOOD PRESSURE: 125 MMHG | HEART RATE: 95 BPM | BODY MASS INDEX: 26.77 KG/M2 | HEIGHT: 70 IN | DIASTOLIC BLOOD PRESSURE: 77 MMHG

## 2024-10-14 DIAGNOSIS — M79.675 PAIN IN TOES OF BOTH FEET: ICD-10-CM

## 2024-10-14 DIAGNOSIS — B35.1 ONYCHOMYCOSIS: Primary | ICD-10-CM

## 2024-10-14 DIAGNOSIS — M79.674 PAIN IN TOES OF BOTH FEET: ICD-10-CM

## 2024-10-14 PROCEDURE — 11721 DEBRIDE NAIL 6 OR MORE: CPT | Performed by: PODIATRIST

## 2024-10-14 NOTE — PROGRESS NOTES
[facility]  [address]   [address] [place] [address]  Tel: [phone]   Fax: [phone]    Subjective     CC: painful and elongated toe nails    HPI:  Jass Fox is a 53 y.o. year old male who presents to clinic today complaining of painful and elongated toenails. The patient states their digits are painful when the toenails are elongated, causing rubbing in shoe gear. The patient denies tingling and numbness in the lower extremities. Patient denies any rest pain or claudication symptoms.The patient denies any history of acute trauma. The patient denies any other pedal complaints.     Primary care physician is Manjeet Lewis DO.    ROS:    Constitutional: Denies nausea, vomiting, fever, chills.  Neurologic:  numbness, tingling, and burning in the feet.    Vascular: Denies symptoms of lower extremity claudication.    Skin: Denies open wounds.  Otherwise negative except as noted in the HPI.     PMH:  Past Medical History:   Diagnosis Date    Asthma     Eczema     Hyperlipidemia        Surgical History:   Past Surgical History:   Procedure Laterality Date    COLONOSCOPY N/A 2024    COLONOSCOPY POLYPECTOMY SNARE/COLD BIOPSY performed by Radha Cruz MD at [facility] OR    SHOULDER SURGERY Left     rotator cuff       Social History:  Social History     Tobacco Use    Smoking status: Former     Current packs/day: 0.00     Average packs/day: 1 pack/day for 15.0 years (15.0 ttl pk-yrs)     Types: Cigarettes, Cigars     Start date: 10/10/2002     Quit date: 10/10/2017     Years since quittin.0    Smokeless tobacco: Never   Vaping Use    Vaping status: Every Day    Substances: Nicotine   Substance Use Topics    Alcohol use: Yes     Alcohol/week: 4.2 standard drinks of alcohol     Types: 5 Standard drinks or equivalent per week     Comment: socially    Drug use: No       Medications:  Prior to Admission medications    Medication Sig Start Date End Date Taking? Authorizing Provider

## 2024-10-25 ENCOUNTER — OFFICE VISIT (OUTPATIENT)
Dept: DERMATOLOGY | Age: 54
End: 2024-10-25

## 2024-10-25 VITALS
HEIGHT: 70 IN | SYSTOLIC BLOOD PRESSURE: 133 MMHG | OXYGEN SATURATION: 97 % | HEART RATE: 89 BPM | DIASTOLIC BLOOD PRESSURE: 81 MMHG | TEMPERATURE: 98.3 F | BODY MASS INDEX: 26.05 KG/M2 | WEIGHT: 182 LBS

## 2024-10-25 DIAGNOSIS — L20.84 INTRINSIC ATOPIC DERMATITIS: Primary | ICD-10-CM

## 2024-10-25 RX ORDER — FAMOTIDINE 20 MG/1
40 TABLET, FILM COATED ORAL 2 TIMES DAILY
Qty: 60 TABLET | Refills: 2 | Status: SHIPPED | OUTPATIENT
Start: 2024-10-25

## 2024-10-25 RX ORDER — TRALOKINUMAB-LDRM 300 MG/2ML
1 INJECTION, SOLUTION SUBCUTANEOUS
Qty: 4 ML | Refills: 5 | Status: SHIPPED | OUTPATIENT
Start: 2024-10-25

## 2024-10-25 RX ORDER — TRALOKINUMAB-LDRM 300 MG/2ML
2 INJECTION, SOLUTION SUBCUTANEOUS ONCE
Qty: 4 ML | Refills: 0 | Status: SHIPPED | OUTPATIENT
Start: 2024-10-25 | End: 2024-10-25

## 2024-10-25 NOTE — PROGRESS NOTES
Dermatology Patient Note  [facility] DERMATOLOGY  [address]  [address]  [place] [address]  Dept: [phone]  Dept Fax: [phone]      VISITDATE: 10/25/2024   REFERRING PROVIDER: No ref. provider found      Jass Fox is a 53 y.o. male  who presents today in the office for:    Other (Patient presents today for eczema on his forehead, arms, torso, neck At last visit Scleritis with Dupixent and very elevated triglycerides with Rinvoq in 2022. Currently treating the eczema clobetasol, little improvement. )      HISTORY OF PRESENT ILLNESS:  As above.  Pt developed conjunctivitis with Dupixent and had extremely elevated triglycerides with Rinvoq.  Both medications seemed to work well when he was taking them.    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    Mixed hyperlipidemia 08/17/2021    Erectile dysfunction 01/29/2021    Chronic eczema 05/04/2012    Mild intermittent asthma without complication        CURRENT MEDICATIONS:   Current Outpatient Medications   Medication Sig Dispense Refill    famotidine (PEPCID) 20 MG tablet Take 2 tablets by mouth 2 times daily 60 tablet 2    albuterol sulfate HFA (PROAIR HFA) 108 (90 Base) MCG/ACT inhaler INHALE TWO PUFFS BY MOUTH FOUR TIMES A DAY AS NEEDED 1 each 1    atorvastatin (LIPITOR) 40 MG tablet Take 1 tablet by mouth daily 90 tablet 1    cetirizine (ZYRTEC) 10 MG tablet Take 1 tablet by mouth daily 90 tablet 1    Omega-3 Fatty Acids (FISH OIL) 1000 MG capsule Take 1 capsule by mouth daily 90 capsule 1    tadalafil (CIALIS) 20 MG tablet Take 1 tablet by mouth as needed for Erectile Dysfunction 30 tablet 1

## 2024-11-29 DIAGNOSIS — L20.84 INTRINSIC ATOPIC DERMATITIS: ICD-10-CM

## 2024-11-29 RX ORDER — FAMOTIDINE 20 MG/1
40 TABLET, FILM COATED ORAL 2 TIMES DAILY
Qty: 60 TABLET | Refills: 2 | Status: SHIPPED | OUTPATIENT
Start: 2024-11-29

## 2024-11-29 NOTE — TELEPHONE ENCOUNTER
----- Message from Nawaf HEATH sent at 11/27/2024 12:25 PM EST -----  Regarding: Assistance denied  Good morning. I just spoke with [employer] for ADBRY patient assistance and patient was denied because income is too high.  He is getting new insurance Jan 1.  I can place the rx's on hold until his next follow-up.      ThanksLETI

## 2025-01-14 DIAGNOSIS — J45.20 MILD INTERMITTENT ASTHMA WITHOUT COMPLICATION: Chronic | ICD-10-CM

## 2025-01-14 DIAGNOSIS — N52.9 ERECTILE DYSFUNCTION, UNSPECIFIED ERECTILE DYSFUNCTION TYPE: ICD-10-CM

## 2025-01-14 RX ORDER — TADALAFIL 20 MG/1
20 TABLET ORAL PRN
Qty: 30 TABLET | Refills: 0 | OUTPATIENT
Start: 2025-01-14

## 2025-01-14 RX ORDER — ALBUTEROL SULFATE 90 UG/1
INHALANT RESPIRATORY (INHALATION)
Qty: 1 EACH | Refills: 1 | OUTPATIENT
Start: 2025-01-14

## 2025-01-14 NOTE — TELEPHONE ENCOUNTER
Jass Fox is calling to request a refill on the following medication(s):    Medication Request:  Requested Prescriptions     Pending Prescriptions Disp Refills    tadalafil (CIALIS) 20 MG tablet 30 tablet 0     Sig: Take 1 tablet by mouth as needed for Erectile Dysfunction    albuterol sulfate HFA (PROAIR HFA) 108 (90 Base) MCG/ACT inhaler 1 each 1     Sig: INHALE TWO PUFFS BY MOUTH FOUR TIMES A DAY AS NEEDED       Last Visit Date (If Applicable):  9/24/2024    Next Visit Date:    03/27/2024        Patient to [facility] with you 03/27/2025

## 2025-01-30 ENCOUNTER — TELEPHONE (OUTPATIENT)
Age: 55
End: 2025-01-30

## 2025-01-30 NOTE — TELEPHONE ENCOUNTER
[facility] and [facility] Health Plan terminated 6/30/2024.

## 2025-01-30 NOTE — ANESTHESIA POSTPROCEDURE EVALUATION
Department of Anesthesiology  Postprocedure Note    Patient: Jass Fox  MRN: [id number]  YOB: 1970  Date of evaluation: 2/9/2024    Procedure Summary       Date: 02/09/24 Room / Location: [address]    Anesthesia Start: 0735 Anesthesia Stop: 0806    Procedure: COLONOSCOPY POLYPECTOMY SNARE/COLD BIOPSY Diagnosis:       Screen for colon cancer      (Screen for colon cancer [Z12.11])    Surgeons: Radha Cruz MD Responsible Provider: Le Dee MD    Anesthesia Type: MAC ASA Status: 2            Anesthesia Type: No value filed.    Lisa Phase I:      Lisa Phase II: Lisa Score: 10    Anesthesia Post Evaluation    Cardiovascular status: hemodynamically stable    No notable events documented.  
2500

## 2025-01-31 ENCOUNTER — OFFICE VISIT (OUTPATIENT)
Age: 55
End: 2025-01-31

## 2025-01-31 VITALS
BODY MASS INDEX: 26.77 KG/M2 | OXYGEN SATURATION: 94 % | WEIGHT: 187 LBS | HEART RATE: 100 BPM | HEIGHT: 70 IN | TEMPERATURE: 98.2 F | DIASTOLIC BLOOD PRESSURE: 82 MMHG | SYSTOLIC BLOOD PRESSURE: 125 MMHG

## 2025-01-31 DIAGNOSIS — L20.84 INTRINSIC ATOPIC DERMATITIS: Primary | ICD-10-CM

## 2025-01-31 RX ORDER — TRALOKINUMAB-LDRM 150 MG/ML
INJECTION, SOLUTION SUBCUTANEOUS
Qty: 4 ML | Refills: 0 | Status: SHIPPED | OUTPATIENT
Start: 2025-01-31

## 2025-01-31 RX ORDER — TRALOKINUMAB-LDRM 150 MG/ML
INJECTION, SOLUTION SUBCUTANEOUS
Qty: 2 ML | Refills: 5 | Status: SHIPPED | OUTPATIENT
Start: 2025-01-31

## 2025-01-31 NOTE — PROGRESS NOTES
Dermatology Patient Note  [facility]  [facility] DERMATOLOGY  [address]  [place] [address]  Dept: [phone]  Dept Fax: [phone]      VISITDATE: 1/31/2025   REFERRING PROVIDER: No ref. provider found      Jass Fox is a 54 y.o. male  who presents today in the office for:    Other (Patient presents today for a follow up of atopic dermatitis on the forehead arms, torso and neck. . Currently treating with pepcid. He was unable to start the Adbry d/t being in between insurance. He would like to try to get the Adbry now that his new insurance is active. )      HISTORY OF PRESENT ILLNESS:  As above.  Pt continues with pruritus, widespread, which is unresponsive to topical therapy.  Pt states that Dupixent worked well, but developed conjunctivitis.  Pt also states that Rinvoq worked, however, his triglycerides were critically high with this.    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 2 mL 5    Tralokinumab-ldrm (ADBRY) 150 MG/ML SOSY On day zero of treatment, inject 600 mg, SC, once. 4 mL 0    famotidine (PEPCID) 20 MG tablet Take 2 tablets by mouth 2 times daily 60 tablet 2    albuterol sulfate HFA (PROAIR HFA) 108 (90 Base) MCG/ACT inhaler INHALE TWO PUFFS BY MOUTH FOUR TIMES A DAY AS NEEDED 1 each 1    atorvastatin (LIPITOR) 40 MG tablet

## 2025-02-06 ENCOUNTER — TELEPHONE (OUTPATIENT)
Age: 55
End: 2025-02-06

## 2025-02-06 NOTE — TELEPHONE ENCOUNTER
[facility] faxed notification stating pt would like to do fewer injections. Is provider ok with doing 300 mg/2mL PFS q2w for maintenance.    Please advise.

## 2025-02-14 DIAGNOSIS — L20.84 INTRINSIC ATOPIC DERMATITIS: Primary | ICD-10-CM

## 2025-02-14 RX ORDER — TRALOKINUMAB-LDRM 300 MG/2ML
300 INJECTION, SOLUTION SUBCUTANEOUS
Qty: 4 ML | Refills: 5 | Status: ACTIVE | OUTPATIENT
Start: 2025-02-14

## 2025-02-20 NOTE — TELEPHONE ENCOUNTER
Received another fax asking for clarification. Rx was resent to [facility] instead of [facility]. Spoke to [facility] pharmacy and clarified that yes provider is okay with fewer injections 300 mg/2ml q 2 weeks.

## 2025-03-27 ENCOUNTER — OFFICE VISIT (OUTPATIENT)
Dept: FAMILY MEDICINE CLINIC | Age: 55
End: 2025-03-27
Payer: COMMERCIAL

## 2025-03-27 VITALS
BODY MASS INDEX: 25.83 KG/M2 | DIASTOLIC BLOOD PRESSURE: 74 MMHG | RESPIRATION RATE: 12 BRPM | HEART RATE: 101 BPM | WEIGHT: 180.4 LBS | SYSTOLIC BLOOD PRESSURE: 118 MMHG | TEMPERATURE: 98.5 F | OXYGEN SATURATION: 96 % | HEIGHT: 70 IN

## 2025-03-27 DIAGNOSIS — J45.20 MILD INTERMITTENT ASTHMA WITHOUT COMPLICATION: Chronic | ICD-10-CM

## 2025-03-27 DIAGNOSIS — N52.01 ERECTILE DYSFUNCTION DUE TO ARTERIAL INSUFFICIENCY: ICD-10-CM

## 2025-03-27 DIAGNOSIS — R91.1 LEFT UPPER LOBE PULMONARY NODULE: ICD-10-CM

## 2025-03-27 DIAGNOSIS — Z76.89 ENCOUNTER TO ESTABLISH CARE WITH NEW DOCTOR: Primary | ICD-10-CM

## 2025-03-27 DIAGNOSIS — Z72.0 NICOTINE VAPOR PRODUCT USER: ICD-10-CM

## 2025-03-27 DIAGNOSIS — H15.001 SCLERITIS OF RIGHT EYE: ICD-10-CM

## 2025-03-27 DIAGNOSIS — N52.9 ERECTILE DYSFUNCTION, UNSPECIFIED ERECTILE DYSFUNCTION TYPE: ICD-10-CM

## 2025-03-27 DIAGNOSIS — E78.2 MIXED HYPERLIPIDEMIA: ICD-10-CM

## 2025-03-27 DIAGNOSIS — G51.0 BELL'S PALSY: ICD-10-CM

## 2025-03-27 DIAGNOSIS — L30.9 CHRONIC ECZEMA: ICD-10-CM

## 2025-03-27 DIAGNOSIS — K21.9 GERD WITHOUT ESOPHAGITIS: ICD-10-CM

## 2025-03-27 PROCEDURE — 99214 OFFICE O/P EST MOD 30 MIN: CPT | Performed by: FAMILY MEDICINE

## 2025-03-27 RX ORDER — TADALAFIL 20 MG/1
20 TABLET ORAL PRN
Qty: 30 TABLET | Refills: 1 | Status: SHIPPED | OUTPATIENT
Start: 2025-03-27

## 2025-03-27 SDOH — ECONOMIC STABILITY: FOOD INSECURITY: WITHIN THE PAST 12 MONTHS, YOU WORRIED THAT YOUR FOOD WOULD RUN OUT BEFORE YOU GOT MONEY TO BUY MORE.: NEVER TRUE

## 2025-03-27 SDOH — ECONOMIC STABILITY: FOOD INSECURITY: WITHIN THE PAST 12 MONTHS, THE FOOD YOU BOUGHT JUST DIDN'T LAST AND YOU DIDN'T HAVE MONEY TO GET MORE.: NEVER TRUE

## 2025-03-27 ASSESSMENT — PATIENT HEALTH QUESTIONNAIRE - PHQ9
SUM OF ALL RESPONSES TO PHQ QUESTIONS 1-9: 0
SUM OF ALL RESPONSES TO PHQ QUESTIONS 1-9: 0
1. LITTLE INTEREST OR PLEASURE IN DOING THINGS: NOT AT ALL
2. FEELING DOWN, DEPRESSED OR HOPELESS: NOT AT ALL
SUM OF ALL RESPONSES TO PHQ QUESTIONS 1-9: 0
SUM OF ALL RESPONSES TO PHQ QUESTIONS 1-9: 0

## 2025-03-27 ASSESSMENT — ENCOUNTER SYMPTOMS
ALLERGIC/IMMUNOLOGIC NEGATIVE: 1
GASTROINTESTINAL NEGATIVE: 1
RESPIRATORY NEGATIVE: 1
EYES NEGATIVE: 1

## 2025-03-27 NOTE — PROGRESS NOTES
Subjective:      Patient ID: Jass Fox is a 54 y.o. male.    Skin Problem  This is a chronic problem. The current episode started more than 1 month ago. The problem has been gradually improving since onset. The affected locations include the scalp. The rash is characterized by itchiness and scaling. It is unknown if there was an exposure to a precipitant. Treatments tried: Adbry, tacrolimus. The treatment provided moderate relief. His past medical history is significant for eczema.       Review of Systems   Constitutional: Negative.    HENT: Negative.     Eyes: Negative.    Respiratory: Negative.     Cardiovascular: Negative.    Gastrointestinal: Negative.    Endocrine: Negative.    Musculoskeletal: Negative.    Skin: Negative.    Allergic/Immunologic: Negative.    Neurological: Negative.    Hematological: Negative.    Psychiatric/Behavioral: Negative.     Past family and social history unremarkable.    Objective:   Physical Exam  Vitals and nursing note reviewed.   Constitutional:       Appearance: He is well-developed.   HENT:      Head: Normocephalic and atraumatic.      Right Ear: External ear normal.      Left Ear: External ear normal.      Nose: Nose normal.      Comments: Seasonal allergy  Eyes:      Conjunctiva/sclera: Conjunctivae normal.      Pupils: Pupils are equal, round, and reactive to light.   Cardiovascular:      Rate and Rhythm: Normal rate and regular rhythm.      Heart sounds: Normal heart sounds.      Comments: Hyperlipidemia  Pulmonary:      Effort: Pulmonary effort is normal.      Breath sounds: Normal breath sounds.      Comments: Mild intermittent asthma  6 mm pulmonary nodule-incidental finding  Abdominal:      General: Bowel sounds are normal.      Palpations: Abdomen is soft.      Comments: GERD   Genitourinary:     Comments: ED  Musculoskeletal:         General: Normal range of motion.      Cervical back: Normal range of motion and neck supple.   Skin:     General: Skin is warm and

## 2025-05-05 ENCOUNTER — TELEPHONE (OUTPATIENT)
Age: 55
End: 2025-05-05

## 2025-05-05 NOTE — TELEPHONE ENCOUNTER
----- Message from Nawaf HEATH sent at 5/5/2025 11:30 AM EDT -----  Regarding: Adbry reauthorization  Good morning,     I have received a PA renewal form for [employer] for this patient.  Appears he has an appt on 5/9.  Could you make note that new rx will be required after this appt so thank I can get this reauthorization submitted to his plan.     Thanks  LETI

## 2025-05-09 ENCOUNTER — OFFICE VISIT (OUTPATIENT)
Age: 55
End: 2025-05-09
Payer: COMMERCIAL

## 2025-05-09 VITALS
DIASTOLIC BLOOD PRESSURE: 78 MMHG | SYSTOLIC BLOOD PRESSURE: 116 MMHG | BODY MASS INDEX: 26.34 KG/M2 | TEMPERATURE: 98.6 F | HEART RATE: 94 BPM | HEIGHT: 70 IN | OXYGEN SATURATION: 96 % | WEIGHT: 184 LBS

## 2025-05-09 DIAGNOSIS — L20.84 INTRINSIC ATOPIC DERMATITIS: Primary | ICD-10-CM

## 2025-05-09 PROCEDURE — 99213 OFFICE O/P EST LOW 20 MIN: CPT | Performed by: PHYSICIAN ASSISTANT

## 2025-05-09 NOTE — PROGRESS NOTES
Dermatology Patient Note  [facility]  [facility] DERMATOLOGY  [address]  [place] [address]  Dept: [phone]  Dept Fax: [phone]      VISITDATE: 5/9/2025   REFERRING PROVIDER: No ref. provider found      Jass Fox is a 54 y.o. male  who presents today in the office for:    Other (Patient presents today as a f/u 3mo Intrinsic atopic dermatitis. Patient admits skin is doing well today.  Patient is using Adbry.  Patient declines refills today.  Patient offers no nw concerns today.  )      HISTORY OF PRESENT ILLNESS:  As above.  Pt has been off of Adbry, for several weeks, d/t an issue with insurance coverage.  He feels that he is still receiving some residual benefit from the Adbry, as he is currently only dealing with some mild scalp pruritus.  Pt states that he was not having any eye symptoms while using Adbry.    MEDICAL PROBLEMS:  Patient Active Problem List    Diagnosis Date Noted    Bell's palsy 06/27/2022     Priority: Medium    Nicotine vapor product user 03/27/2025    GERD without esophagitis 03/27/2025    Left upper lobe pulmonary nodule 06/27/2022     6 mm nodule noted on CTA head and neck, will need outpatient low-dose CT scan chest to further evaluate for pulmonary nodules      Mixed hyperlipidemia 08/17/2021    Erectile dysfunction 01/29/2021    Chronic eczema 05/04/2012    Mild intermittent asthma without complication        CURRENT MEDICATIONS:   Current Outpatient Medications   Medication Sig Dispense Refill    Tralokinumab-ldrm 300 MG/2ML SOAJ Inject 2 mLs into the skin every 14 days 4 mL 5    tadalafil (CIALIS) 20 MG tablet Take 1 tablet by mouth as needed for Erectile Dysfunction 30 tablet 1    albuterol sulfate HFA (PROAIR HFA) 108 (90 Base) MCG/ACT inhaler INHALE TWO PUFFS BY MOUTH FOUR TIMES A DAY AS NEEDED 1 each 1    atorvastatin (LIPITOR) 40 MG tablet Take 1 tablet by mouth daily 90 tablet 1    cetirizine (ZYRTEC) 10 MG tablet Take 1

## 2025-05-13 DIAGNOSIS — L20.84 INTRINSIC ATOPIC DERMATITIS: ICD-10-CM

## 2025-05-13 RX ORDER — TRALOKINUMAB-LDRM 300 MG/2ML
INJECTION, SOLUTION SUBCUTANEOUS
Refills: 5 | OUTPATIENT
Start: 2025-05-13

## 2025-06-27 ENCOUNTER — OFFICE VISIT (OUTPATIENT)
Dept: FAMILY MEDICINE CLINIC | Age: 55
End: 2025-06-27
Payer: COMMERCIAL

## 2025-06-27 VITALS
WEIGHT: 183 LBS | RESPIRATION RATE: 12 BRPM | TEMPERATURE: 97.5 F | SYSTOLIC BLOOD PRESSURE: 138 MMHG | BODY MASS INDEX: 26.2 KG/M2 | HEIGHT: 70 IN | DIASTOLIC BLOOD PRESSURE: 80 MMHG | OXYGEN SATURATION: 96 % | HEART RATE: 98 BPM

## 2025-06-27 DIAGNOSIS — L20.9 ATOPIC DERMATITIS, UNSPECIFIED TYPE: Primary | ICD-10-CM

## 2025-06-27 DIAGNOSIS — Z28.21 VACCINATION DECLINED: ICD-10-CM

## 2025-06-27 DIAGNOSIS — N52.9 ERECTILE DYSFUNCTION, UNSPECIFIED ERECTILE DYSFUNCTION TYPE: ICD-10-CM

## 2025-06-27 DIAGNOSIS — E78.2 MIXED HYPERLIPIDEMIA: ICD-10-CM

## 2025-06-27 DIAGNOSIS — Z91.199 NON-COMPLIANCE WITH TREATMENT: ICD-10-CM

## 2025-06-27 DIAGNOSIS — J45.20 MILD INTERMITTENT ASTHMA WITHOUT COMPLICATION: Chronic | ICD-10-CM

## 2025-06-27 DIAGNOSIS — Z71.9 ENCOUNTER FOR CONSULTATION: ICD-10-CM

## 2025-06-27 PROCEDURE — 99214 OFFICE O/P EST MOD 30 MIN: CPT | Performed by: FAMILY MEDICINE

## 2025-06-27 RX ORDER — ALBUTEROL SULFATE 90 UG/1
INHALANT RESPIRATORY (INHALATION)
Qty: 1 EACH | Refills: 5 | Status: SHIPPED | OUTPATIENT
Start: 2025-06-27

## 2025-06-27 RX ORDER — ATORVASTATIN CALCIUM 40 MG/1
40 TABLET, FILM COATED ORAL DAILY
Qty: 90 TABLET | Refills: 1 | Status: SHIPPED | OUTPATIENT
Start: 2025-06-27

## 2025-06-27 RX ORDER — TADALAFIL 20 MG/1
20 TABLET ORAL PRN
Qty: 30 TABLET | Refills: 4 | Status: SHIPPED | OUTPATIENT
Start: 2025-06-27

## 2025-06-27 SDOH — ECONOMIC STABILITY: FOOD INSECURITY: WITHIN THE PAST 12 MONTHS, YOU WORRIED THAT YOUR FOOD WOULD RUN OUT BEFORE YOU GOT MONEY TO BUY MORE.: NEVER TRUE

## 2025-06-27 SDOH — ECONOMIC STABILITY: FOOD INSECURITY: WITHIN THE PAST 12 MONTHS, THE FOOD YOU BOUGHT JUST DIDN'T LAST AND YOU DIDN'T HAVE MONEY TO GET MORE.: NEVER TRUE

## 2025-06-27 ASSESSMENT — ENCOUNTER SYMPTOMS
RESPIRATORY NEGATIVE: 1
EYES NEGATIVE: 1
ALLERGIC/IMMUNOLOGIC NEGATIVE: 1
GASTROINTESTINAL NEGATIVE: 1

## 2025-06-27 NOTE — PROGRESS NOTES
Subjective:      Patient ID: Jass Fox is a 54 y.o. male.    Skin Problem  This is a chronic problem. The current episode started more than 1 month ago. The problem has been gradually improving since onset. The rash is diffuse. The rash is characterized by itchiness and dryness. It is unknown if there was an exposure to a precipitant. Treatments tried: tralokinumab inj. The treatment provided moderate relief. His past medical history is significant for eczema.       Review of Systems   Constitutional: Negative.    HENT: Negative.     Eyes: Negative.    Respiratory: Negative.     Cardiovascular: Negative.    Gastrointestinal: Negative.    Endocrine: Negative.    Musculoskeletal:  Positive for arthralgias.   Skin: Negative.    Allergic/Immunologic: Negative.    Neurological: Negative.    Hematological: Negative.    Psychiatric/Behavioral:  Positive for dysphoric mood.    Past family and social history unremarkable.   Diagnosis Orders   1. Atopic dermatitis, unspecified type        2. Mixed hyperlipidemia  atorvastatin (LIPITOR) 40 MG tablet    Lipid Panel      3. Erectile dysfunction, unspecified erectile dysfunction type  tadalafil (CIALIS) 20 MG tablet      4. Mild intermittent asthma without complication  albuterol sulfate HFA (PROAIR HFA) 108 (90 Base) MCG/ACT inhaler      5. Non-compliance with treatment        6. Vaccination declined              Objective:   Physical Exam  Vitals and nursing note reviewed.   Constitutional:       Appearance: He is well-developed.   HENT:      Head: Normocephalic and atraumatic.      Right Ear: External ear normal.      Left Ear: External ear normal.      Nose: Nose normal.      Comments: Allergies  Eyes:      Conjunctiva/sclera: Conjunctivae normal.      Pupils: Pupils are equal, round, and reactive to light.   Cardiovascular:      Rate and Rhythm: Normal rate and regular rhythm.      Heart sounds: Normal heart sounds.   Pulmonary:      Effort: Pulmonary effort is normal.

## 2025-07-01 DIAGNOSIS — S46.012D TRAUMATIC TEAR OF LEFT ROTATOR CUFF, UNSPECIFIED TEAR EXTENT, SUBSEQUENT ENCOUNTER: Primary | ICD-10-CM

## 2025-07-01 DIAGNOSIS — M25.819 SHOULDER IMPINGEMENT: ICD-10-CM

## 2025-07-01 DIAGNOSIS — M75.02 ADHESIVE CAPSULITIS OF LEFT SHOULDER: ICD-10-CM

## 2025-08-22 ENCOUNTER — OFFICE VISIT (OUTPATIENT)
Age: 55
End: 2025-08-22
Payer: COMMERCIAL

## 2025-08-22 VITALS
SYSTOLIC BLOOD PRESSURE: 118 MMHG | BODY MASS INDEX: 25.62 KG/M2 | HEART RATE: 89 BPM | DIASTOLIC BLOOD PRESSURE: 73 MMHG | OXYGEN SATURATION: 94 % | TEMPERATURE: 98 F | WEIGHT: 179 LBS | HEIGHT: 70 IN

## 2025-08-22 DIAGNOSIS — L20.84 INTRINSIC ATOPIC DERMATITIS: Primary | ICD-10-CM

## 2025-08-22 PROCEDURE — 99214 OFFICE O/P EST MOD 30 MIN: CPT | Performed by: PHYSICIAN ASSISTANT

## 2025-08-22 RX ORDER — TRALOKINUMAB-LDRM 300 MG/2ML
600 INJECTION, SOLUTION SUBCUTANEOUS ONCE
Qty: 4 ML | Refills: 0 | Status: SHIPPED | OUTPATIENT
Start: 2025-08-22 | End: 2025-08-22

## 2025-08-22 RX ORDER — PREDNISONE 10 MG/1
TABLET ORAL
Qty: 20 TABLET | Refills: 0 | Status: SHIPPED | OUTPATIENT
Start: 2025-08-22 | End: 2025-08-30

## 2025-09-02 ENCOUNTER — TELEPHONE (OUTPATIENT)
Age: 55
End: 2025-09-02

## (undated) DEVICE — STAZ ENDO KIT: Brand: MEDLINE INDUSTRIES, INC.

## (undated) DEVICE — SNARE ENDOSCP M L240CM LOOP W27MM SHTH DIA2.4MM OVL FLX

## (undated) DEVICE — TRAP SURG QUAD PARABOLA SLOT DSGN SFTY SCRN TRAPEASE